# Patient Record
Sex: MALE | Race: WHITE | Employment: FULL TIME | URBAN - NONMETROPOLITAN AREA
[De-identification: names, ages, dates, MRNs, and addresses within clinical notes are randomized per-mention and may not be internally consistent; named-entity substitution may affect disease eponyms.]

---

## 2020-11-18 RX ORDER — SACUBITRIL AND VALSARTAN 24; 26 MG/1; MG/1
1 TABLET, FILM COATED ORAL 2 TIMES DAILY
COMMUNITY

## 2020-11-18 RX ORDER — DULOXETIN HYDROCHLORIDE 30 MG/1
30 CAPSULE, DELAYED RELEASE ORAL DAILY
COMMUNITY

## 2020-11-18 RX ORDER — MIRTAZAPINE 15 MG/1
45 TABLET, FILM COATED ORAL NIGHTLY
COMMUNITY

## 2020-11-18 RX ORDER — TIZANIDINE 4 MG/1
4 TABLET ORAL EVERY 8 HOURS PRN
COMMUNITY

## 2020-11-18 RX ORDER — CILOSTAZOL 100 MG/1
100 TABLET ORAL 2 TIMES DAILY
Status: ON HOLD | COMMUNITY
End: 2020-12-14 | Stop reason: HOSPADM

## 2020-11-18 RX ORDER — ALPRAZOLAM 1 MG/1
1 TABLET ORAL 2 TIMES DAILY
COMMUNITY

## 2020-11-18 RX ORDER — ROSUVASTATIN CALCIUM 10 MG/1
10 TABLET, COATED ORAL NIGHTLY
COMMUNITY

## 2020-11-18 RX ORDER — AMLODIPINE BESYLATE 5 MG/1
5 TABLET ORAL DAILY
Status: ON HOLD | COMMUNITY
End: 2020-12-14 | Stop reason: HOSPADM

## 2020-11-18 RX ORDER — CARVEDILOL 3.12 MG/1
3.12 TABLET ORAL 2 TIMES DAILY WITH MEALS
Status: ON HOLD | COMMUNITY
End: 2020-12-14 | Stop reason: HOSPADM

## 2020-11-24 ENCOUNTER — OFFICE VISIT (OUTPATIENT)
Dept: CARDIOTHORACIC SURGERY | Age: 60
End: 2020-11-24
Payer: COMMERCIAL

## 2020-11-24 VITALS
HEART RATE: 116 BPM | WEIGHT: 244 LBS | RESPIRATION RATE: 20 BRPM | BODY MASS INDEX: 34.16 KG/M2 | DIASTOLIC BLOOD PRESSURE: 80 MMHG | SYSTOLIC BLOOD PRESSURE: 110 MMHG | OXYGEN SATURATION: 97 % | HEIGHT: 71 IN

## 2020-11-24 PROCEDURE — 99205 OFFICE O/P NEW HI 60 MIN: CPT | Performed by: THORACIC SURGERY (CARDIOTHORACIC VASCULAR SURGERY)

## 2020-11-24 NOTE — LETTER
[address] [facility] [place], Cardiology  [address], [place] [address], [address] [address]  Phone: [phone]  Fax: [phone]            2020          Re:  Mr. Debra Lin   :  1960  [address] [place]  [facility] [address]    Mr. Katerina Camilo,    Please note that you will need to have pre-surgical Covid testing done on  between noon and 4pm at the [facility] , [address] . You will drive up to the front of the building and someone will come out to your car. Tell them you are there for pre-op covid testing. This test will need to be done that day for results to be available prior to your surgery. You will also need to come to [facility] registration on  at 8:45 am to have your other pre-op testing done. You will come to the hospital 12/10 at 630 am for surgery. Nothing to eat or drink after midnight. If you have any further questions, please do not hesitate to contact my office.     Sincerely,         Electronically signed: Britney Nunn MD, 2020 3:51 PM

## 2020-11-24 NOTE — PROGRESS NOTES
[address] [facility] Cardiothoracic Surgery  [address], [place] [address], [facility], [place]  Phone: [phone]  Fax: [phone]    Name: Alexis Miller  : 1960    DATE: 2020        The patient is a 26-year-old obese borderline diabetic male who presented recently to his nurse practitioner for routine follow-up. He was noted to be significantly tachycardic. Out of concern the patient suggested that he be seen by a cardiologist.  The patient was seen and evaluated by Dr. Karly Hamilton, cardiologist at [facility].  An echocardiogram was performed that demonstrated evidence of a dilated cardiomyopathy with a very low ejection fraction of 30%. For this reason the patient underwent cardiac catheterization on 2020. This reveals a left ventricle that shows a large area of anterior apical and inferior apical wall akinesis with a decreased ejection fraction of only 20%. The left main artery has some distal disease. The LAD is totally occluded proximally after a moderate size diagonal branch the diagonal branch itself appears to have a proximal 80% stenosis. The distal LAD is seen very poorly and appears to be inoperable. The left circumflex system appears to be a balanced system there is a proximal complex 90% stenosis of a large first obtuse marginal branch. The RCA is relatively small it is totally occluded near its origin there is a PDA that fills by right to right collaterals. The patient was placed on heart failure medication and the recommendation was to undergo coronary revascularization to preserve what remaining LV function that he has to prevent sudden cardiac death. The patient denies ever having chest pain or angina symptoms he does not have a history of neuropathy but he is a borderline diabetic he has smoked cigarettes since he was a teenager and smokes about a pack per day.   He denies any shortness activity     Days per week: Not on file     Minutes per session: Not on file    Stress: Not on file   Relationships    Social connections     Talks on phone: Not on file     Gets together: Not on file     Attends Sabianist service: Not on file     Active member of club or organization: Not on file     Attends meetings of clubs or organizations: Not on file     Relationship status: Not on file    Intimate partner violence     Fear of current or ex partner: Not on file     Emotionally abused: Not on file     Physically abused: Not on file     Forced sexual activity: Not on file   Other Topics Concern    Not on file   Social History Narrative    Not on file         ROS:   HEENT: denies neck pain, sore throat, blurred vision, diplopia. Respiratory: Denies shortness of breath. Denies any hemoptysis or wheezes. Cardiovascular: denies angina, palpitations, lower extremity edema. GI: denies abdominal pain, nausea, vomiting, constipation. : denies urinary frequency, or dysuria. Musculoskeletal: No joint pain or swelling. Neurologic: denies diplopia, headache, or ataxia. The other 14 systems have been reviewed and are negative    Physical Exam: /80 (Site: Left Upper Arm, Position: Sitting, Cuff Size: Medium Adult)   Pulse 116   Resp 20   Ht 5' 11\" (1.803 m)   Wt 244 lb (110.7 kg)   SpO2 97%   BMI 34.03 kg/m²   Pleasant middle-age male who is in no acute distress at this time  ENT: Throat is clear., Dentition is normal., Mucosa clear., Septum intact., No pyorrhea or abscess. Neck: Neck is soft., No cervical masses. , Throat is clear., Neck veins are not distended. , Thyroid is normal size. Respiratory: Clear lung sounds., Normal., No wheezes or rhonchi., No use of accessory muscles. Cardiovascular: Cardiac sounds are clear., Regular rate and rhythm., No murmur., No carotid bruits. , Peripheral pulses are intact., Extremities exhibit no edema or varicosities. ,  There appears to be a sinus tachycardia present  Abdomen: Abdomen is soft., No masses or tenderness. , Liver and spleen not palpable., Normal bowel sounds.,  Obese  Lymphatic: No lymphadenopathy in the cervical, axillary, or femoral areas. Musculoskeletal: No clubbing or cyanosis. , Normal muscle tone., Normal range of motion upper and lower extremities. , No joint inflammation or swelling. Skin: No rashes, lesions, or ulcers., No swelling or edema. Neurologic exam: No lateralizing neurologic findings. , Cranial nerves II-XII are normal., Examination of sensation is normal.  Psychiatric: Patient exhibits normal judgement and is oriented to name, time, and place., No anxiety or depression. This 69-year-old borderline diabetic has severe multivessel coronary disease with chronic occlusion of his RCA and LAD with severe ischemic dilated cardiomyopathy. The only large vessel remaining is the first obtuse marginal branch which has a high-grade proximal stenosis. This patient is at risk for sudden cardiac death. . On review of his films I feel that his LAD is an operable most of the RCA is an operable although I believe the PDA could be grafted. He has a moderate size diagonal branch that is graftable as well. I therefore discussed procedure of salvage multivessel coronary bypass grafting with the patient and his wife. We discussed the risks of the operation which includes bleeding stroke infection reoperation blood transfusion multisystem organ failure respiratory failure and sudden cardiac death. His STS risk score is 2% mortality with a 30% major morbidity mortality. The patient is wife agreed to proceed with bypass surgery I have scheduled his operation to be performed on 12/10/2020 he is to stop his Pletal at this time and stop his Glucophage 2 days before operation.      Electronically signed by Jesus Teixeira MD on 11/24/2020 at 3:40 PM

## 2020-11-30 ENCOUNTER — TELEPHONE (OUTPATIENT)
Dept: CARDIOTHORACIC SURGERY | Age: 60
End: 2020-11-30

## 2020-11-30 NOTE — TELEPHONE ENCOUNTER
Need authorization for surgery on 12/10/2020    Procedure/ CPT: CABG x3 (23282, 02010)    DX/ ICD 10: I 25.10  artherosclerosis of native caoronary artery of native heart without angina pectoris    I 25.5  Ischemic cardiomyopathy

## 2020-12-08 ENCOUNTER — PREP FOR PROCEDURE (OUTPATIENT)
Dept: CARDIOTHORACIC SURGERY | Age: 60
End: 2020-12-08

## 2020-12-08 RX ORDER — CHLORHEXIDINE GLUCONATE 4 G/100ML
SOLUTION TOPICAL ONCE
Status: CANCELLED | OUTPATIENT
Start: 2020-12-09

## 2020-12-08 RX ORDER — SODIUM CHLORIDE, SODIUM LACTATE, POTASSIUM CHLORIDE, CALCIUM CHLORIDE 600; 310; 30; 20 MG/100ML; MG/100ML; MG/100ML; MG/100ML
INJECTION, SOLUTION INTRAVENOUS CONTINUOUS
Status: CANCELLED | OUTPATIENT
Start: 2020-12-08

## 2020-12-08 NOTE — H&P
[address] [facility] Cardiothoracic Surgery  [address], [place]  Phone: [phone]  Fax: [phone]     Name: Florian De La O  : 1960     DATE: 2020           The patient is a 69-year-old obese borderline diabetic male who presented recently to his nurse practitioner for routine follow-up. He was noted to be significantly tachycardic. Out of concern the patient suggested that he be seen by a cardiologist.  The patient was seen and evaluated by Dr. Josie Perez, cardiologist at [facility].  An echocardiogram was performed that demonstrated evidence of a dilated cardiomyopathy with a very low ejection fraction of 30%. For this reason the patient underwent cardiac catheterization on 2020. This reveals a left ventricle that shows a large area of anterior apical and inferior apical wall akinesis with a decreased ejection fraction of only 20%. The left main artery has some distal disease. The LAD is totally occluded proximally after a moderate size diagonal branch the diagonal branch itself appears to have a proximal 80% stenosis. The distal LAD is seen very poorly and appears to be inoperable. The left circumflex system appears to be a balanced system there is a proximal complex 90% stenosis of a large first obtuse marginal branch. The RCA is relatively small it is totally occluded near its origin there is a PDA that fills by right to right collaterals. The patient was placed on heart failure medication and the recommendation was to undergo coronary revascularization to preserve what remaining LV function that he has to prevent sudden cardiac death. The patient denies ever having chest pain or angina symptoms he does not have a history of neuropathy but he is a borderline diabetic he has smoked cigarettes since he was a teenager and smokes about a pack per day.   He denies any shortness of breath or lower extremity edema     History reviewed. No pertinent past medical history. History reviewed. No pertinent surgical history. Current Outpatient Medications   Medication Sig Dispense Refill    ALPRAZolam (XANAX) 1 MG tablet Take 1 mg by mouth 2 times daily. For 30 days        amLODIPine (NORVASC) 5 MG tablet Take 5 mg by mouth daily For 30 days        aspirin EC 81 MG EC tablet Take 81 mg by mouth daily        carvedilol (COREG) 3.125 MG tablet Take 3.125 mg by mouth 2 times daily (with meals)        cilostazol (PLETAL) 100 MG tablet Take 100 mg by mouth 2 times daily For 30 days        DULoxetine (CYMBALTA) 30 MG extended release capsule Take 30 mg by mouth daily For 30 days        sacubitril-valsartan (ENTRESTO) 24-26 MG per tablet Take 1 tablet by mouth 2 times daily        mirtazapine (REMERON) 15 MG tablet Take 45 mg by mouth daily        rosuvastatin (CRESTOR) 10 MG tablet Take 10 mg by mouth daily For 30 days        tiZANidine (ZANAFLEX) 4 MG tablet Take 4 mg by mouth every 8 hours as needed For 20 days          No current facility-administered medications for this visit.       No Known Allergies  History reviewed. No pertinent family history.   Social History            Socioeconomic History    Marital status: Single       Spouse name: Not on file    Number of children: Not on file    Years of education: Not on file    Highest education level: Not on file   Occupational History    Not on file   Social Needs    Financial resource strain: Not on file    Food insecurity       Worry: Not on file       Inability: Not on file    Transportation needs       Medical: Not on file       Non-medical: Not on file   Tobacco Use    Smoking status: Current Every Day Smoker       Packs/day: 0.50       Years: 30.00       Pack years: 15.00       Types: Cigarettes    Smokeless tobacco: Never Used   Substance and Sexual Activity    Alcohol use: Not Currently    Drug use: Never    Sexual activity: Not on file   Lifestyle    Physical activity       Days per week: Not on file       Minutes per session: Not on file    Stress: Not on file   Relationships    Social connections       Talks on phone: Not on file       Gets together: Not on file       Attends Mormonism service: Not on file       Active member of club or organization: Not on file       Attends meetings of clubs or organizations: Not on file       Relationship status: Not on file    Intimate partner violence       Fear of current or ex partner: Not on file       Emotionally abused: Not on file       Physically abused: Not on file       Forced sexual activity: Not on file   Other Topics Concern    Not on file   Social History Narrative    Not on file            ROS:   HEENT: denies neck pain, sore throat, blurred vision, diplopia.    Respiratory: Denies shortness of breath.  Denies any hemoptysis or wheezes.    Cardiovascular: denies angina, palpitations, lower extremity edema.    GI: denies abdominal pain, nausea, vomiting, constipation. : denies urinary frequency, or dysuria.    Musculoskeletal: No joint pain or swelling. Neurologic: denies diplopia, headache, or ataxia.    The other 14 systems have been reviewed and are negative     Physical Exam: /80 (Site: Left Upper Arm, Position: Sitting, Cuff Size: Medium Adult)   Pulse 116   Resp 20   Ht 5' 11\" (1.803 m)   Wt 244 lb (110.7 kg)   SpO2 97%   BMI 34.03 kg/m²   Pleasant middle-age male who is in no acute distress at this time  ENT: Throat is clear., Dentition is normal., Mucosa clear., Septum intact., No pyorrhea or abscess. Neck: Neck is soft., No cervical masses. , Throat is clear., Neck veins are not distended. , Thyroid is normal size. Respiratory: Clear lung sounds., Normal., No wheezes or rhonchi., No use of accessory muscles. Cardiovascular: Cardiac sounds are clear., Regular rate and rhythm., No murmur., No carotid bruits. , Peripheral pulses are intact., Extremities exhibit no edema or varicosities. ,  There appears to be a sinus tachycardia present  Abdomen: Abdomen is soft., No masses or tenderness. , Liver and spleen not palpable., Normal bowel sounds.,  Obese  Lymphatic: No lymphadenopathy in the cervical, axillary, or femoral areas. Musculoskeletal: No clubbing or cyanosis. , Normal muscle tone., Normal range of motion upper and lower extremities. , No joint inflammation or swelling. Skin: No rashes, lesions, or ulcers., No swelling or edema. Neurologic exam: No lateralizing neurologic findings. , Cranial nerves II-XII are normal., Examination of sensation is normal.  Psychiatric: Patient exhibits normal judgement and is oriented to name, time, and place., No anxiety or depression.     This 60year-old borderline diabetic has severe multivessel coronary disease with chronic occlusion of his RCA and LAD with severe ischemic dilated cardiomyopathy. The only large vessel remaining is the first obtuse marginal branch which has a high-grade proximal stenosis. This patient is at risk for sudden cardiac death. . On review of his films I feel that his LAD is an operable most of the RCA is an operable although I believe the PDA could be grafted. He has a moderate size diagonal branch that is graftable as well. I therefore discussed procedure of salvage multivessel coronary bypass grafting with the patient and his wife. We discussed the risks of the operation which includes bleeding stroke infection reoperation blood transfusion multisystem organ failure respiratory failure and sudden cardiac death. His STS risk score is 2% mortality with a 30% major morbidity mortality.   The patient is wife agreed to proceed with bypass surgery I have scheduled his operation to be performed on 12/10/2020 he is to stop his Pletal at this time and stop his Glucophage 2 days before operation.      Electronically signed by Pasquale Knight MD on 11/24/2020 at 3:40 PM          Since Your Last Visit (2wk Ago)    Today  This Visit: Prep for Procedure with Me  Nov 30  Telephone with LUI Longo  Surgery Scheduling  Nov 24  Office Visit with Me  Atherosclerosis of native coronary artery of native heart without angina pectoris (Primary Dx); Ischemic cardiomyopathy     Significant History/Details     needed? No   Smoking: Current Every Day Smoker, 0.5 ppd, 15 pack-years   Smokeless Tobacco: Never Used   Alcohol: Not Currently     Specialty Comments EditShow All   No comments regarding your specialty     Family Comments Edit   None     Care Team and Communications    No referring provider set      No PCP set      Other Patient Care Team Members Specialty   Antonio MD [facility]   My Last Care Coordination            There are no notes for this patient that meet the current filters. Ciro as Reviewed Allergies (Review Complete by You on 11/24/2020)    Medications, History (Surgical) (Reviewed by You on 11/24/2020)    Problem List Never Reviewed         Allergies    New allergies from outside sources are available for reconciliation    No Known Allergies       Ciro as: Review Complete Review Complete by You on 11/24/2020     Problem List Collapse by Default   Collapse by Default        New problems from outside sources are available for reconciliation   None    Selena Belller as Reviewed Never Reviewed   Expand to view 0 more items     None     Surgical History Collapse by Default   Collapse by Default        None   Ciro as Reviewed Reviewed by You on 11/24/2020   Expand to view 0 items    Implants    No implants to display     Lifetime Radiation    None     Medications    New medications from outside sources are available for reconciliation    ALPRAZolam (XANAX) 1 MG tablet Take 1 mg by mouth 2 times daily.  For 30 days    amLODIPine (NORVASC) 5 MG tablet Take 5 mg by mouth daily For 30 days    aspirin EC 81 MG EC tablet Take 81 mg by mouth daily    carvedilol (COREG) 3.125 MG tablet Take 3.125 mg by mouth 2 times daily (with meals)    cilostazol (PLETAL) 100 MG tablet Take 100 mg by mouth 2 times daily For 30 days    DULoxetine (CYMBALTA) 30 MG extended release capsule Take 30 mg by mouth daily For 30 days    sacubitril-valsartan (ENTRESTO) 24-26 MG per tablet Take 1 tablet by mouth 2 times daily    mirtazapine (REMERON) 15 MG tablet Take 45 mg by mouth daily    rosuvastatin (CRESTOR) 10 MG tablet Take 10 mg by mouth daily For 30 days    tiZANidine (ZANAFLEX) 4 MG tablet Take 4 mg by mouth every 8 hours as needed For 20 days   Ciro as Reviewed Reviewed by Alla Burgess on 11/24/2020     Immunizations/Injections    None      Reminders and Results    None

## 2020-12-08 NOTE — H&P (VIEW-ONLY)
shortness of breath or lower extremity edema     History reviewed. No pertinent past medical history. History reviewed. No pertinent surgical history. Current Outpatient Medications   Medication Sig Dispense Refill    ALPRAZolam (XANAX) 1 MG tablet Take 1 mg by mouth 2 times daily. For 30 days        amLODIPine (NORVASC) 5 MG tablet Take 5 mg by mouth daily For 30 days        aspirin EC 81 MG EC tablet Take 81 mg by mouth daily        carvedilol (COREG) 3.125 MG tablet Take 3.125 mg by mouth 2 times daily (with meals)        cilostazol (PLETAL) 100 MG tablet Take 100 mg by mouth 2 times daily For 30 days        DULoxetine (CYMBALTA) 30 MG extended release capsule Take 30 mg by mouth daily For 30 days        sacubitril-valsartan (ENTRESTO) 24-26 MG per tablet Take 1 tablet by mouth 2 times daily        mirtazapine (REMERON) 15 MG tablet Take 45 mg by mouth daily        rosuvastatin (CRESTOR) 10 MG tablet Take 10 mg by mouth daily For 30 days        tiZANidine (ZANAFLEX) 4 MG tablet Take 4 mg by mouth every 8 hours as needed For 20 days          No current facility-administered medications for this visit.       No Known Allergies  History reviewed. No pertinent family history.   Social History            Socioeconomic History    Marital status: Single       Spouse name: Not on file    Number of children: Not on file    Years of education: Not on file    Highest education level: Not on file   Occupational History    Not on file   Social Needs    Financial resource strain: Not on file    Food insecurity       Worry: Not on file       Inability: Not on file    Transportation needs       Medical: Not on file       Non-medical: Not on file   Tobacco Use    Smoking status: Current Every Day Smoker       Packs/day: 0.50       Years: 30.00       Pack years: 15.00       Types: Cigarettes    Smokeless tobacco: Never Used   Substance and Sexual Activity    Alcohol use: Not Currently    Drug use: Never    Sexual activity: Not on file   Lifestyle    Physical activity       Days per week: Not on file       Minutes per session: Not on file    Stress: Not on file   Relationships    Social connections       Talks on phone: Not on file       Gets together: Not on file       Attends [employer] service: Not on file       Active member of club or organization: Not on file       Attends meetings of clubs or organizations: Not on file       Relationship status: Not on file    Intimate partner violence       Fear of current or ex partner: Not on file       Emotionally abused: Not on file       Physically abused: Not on file       Forced sexual activity: Not on file   Other Topics Concern    Not on file   Social History Narrative    Not on file            ROS:   HEENT: denies neck pain, sore throat, blurred vision, diplopia.    Respiratory: Denies shortness of breath.  Denies any hemoptysis or wheezes.    Cardiovascular: denies angina, palpitations, lower extremity edema.    GI: denies abdominal pain, nausea, vomiting, constipation. : denies urinary frequency, or dysuria.    Musculoskeletal: No joint pain or swelling. Neurologic: denies diplopia, headache, or ataxia.    The other 14 systems have been reviewed and are negative     Physical Exam: /80 (Site: Left Upper Arm, Position: Sitting, Cuff Size: Medium Adult)   Pulse 116   Resp 20   Ht 5' 11\" (1.803 m)   Wt 244 lb (110.7 kg)   SpO2 97%   BMI 34.03 kg/m²   Pleasant middle-age male who is in no acute distress at this time  ENT: Throat is clear., Dentition is normal., Mucosa clear., Septum intact., No pyorrhea or abscess. Neck: Neck is soft., No cervical masses. , Throat is clear., Neck veins are not distended. , Thyroid is normal size. Respiratory: Clear lung sounds., Normal., No wheezes or rhonchi., No use of accessory muscles. Cardiovascular: Cardiac sounds are clear., Regular rate and rhythm., No murmur., No carotid bruits. , Peripheral pulses are intact., Extremities exhibit no edema or varicosities. ,  There appears to be a sinus tachycardia present  Abdomen: Abdomen is soft., No masses or tenderness. , Liver and spleen not palpable., Normal bowel sounds.,  Obese  Lymphatic: No lymphadenopathy in the cervical, axillary, or femoral areas. Musculoskeletal: No clubbing or cyanosis. , Normal muscle tone., Normal range of motion upper and lower extremities. , No joint inflammation or swelling. Skin: No rashes, lesions, or ulcers., No swelling or edema. Neurologic exam: No lateralizing neurologic findings. , Cranial nerves II-XII are normal., Examination of sensation is normal.  Psychiatric: Patient exhibits normal judgement and is oriented to name, time, and place., No anxiety or depression.     This 60year-old borderline diabetic has severe multivessel coronary disease with chronic occlusion of his RCA and LAD with severe ischemic dilated cardiomyopathy. The only large vessel remaining is the first obtuse marginal branch which has a high-grade proximal stenosis. This patient is at risk for sudden cardiac death. . On review of his films I feel that his LAD is an operable most of the RCA is an operable although I believe the PDA could be grafted. He has a moderate size diagonal branch that is graftable as well. I therefore discussed procedure of salvage multivessel coronary bypass grafting with the patient and his wife. We discussed the risks of the operation which includes bleeding stroke infection reoperation blood transfusion multisystem organ failure respiratory failure and sudden cardiac death. His STS risk score is 2% mortality with a 30% major morbidity mortality.   The patient is wife agreed to proceed with bypass surgery I have scheduled his operation to be performed on 12/10/2020 he is to stop his Pletal at this time and stop his Glucophage 2 days before operation.      Electronically signed by Hailee Martinez MD on 11/24/2020 at 3:40 PM          Since Your Last Visit (2wk Ago)    Today  This Visit: Prep for Procedure with Me  Nov 30  Telephone with LUI Recinos  Surgery Scheduling  Nov 24  Office Visit with Me  Atherosclerosis of native coronary artery of native heart without angina pectoris (Primary Dx); Ischemic cardiomyopathy     Significant History/Details     needed? No   Smoking: Current Every Day Smoker, 0.5 ppd, 15 pack-years   Smokeless Tobacco: Never Used   Alcohol: Not Currently     Specialty Comments EditShow All   No comments regarding your specialty     Family Comments Edit   None     Care Team and Communications    No referring provider set      No PCP set      Other Patient Care Team Members Specialty   Elliot Gleason MD Cardiothoracic Surgery   My Last Care Coordination            There are no notes for this patient that meet the current filters. Ciro as Reviewed Allergies (Review Complete by You on 11/24/2020)    Medications, History (Surgical) (Reviewed by You on 11/24/2020)    Problem List Never Reviewed         Allergies    New allergies from outside sources are available for reconciliation    No Known Allergies       Ciro as: Review Complete Review Complete by You on 11/24/2020     Problem List Collapse by Default   Collapse by Default        New problems from outside sources are available for reconciliation   None    Fernando Chiu as Reviewed Never Reviewed   Expand to view 0 more items     None     Surgical History Collapse by Default   Collapse by Default        None   Ciro as Reviewed Reviewed by You on 11/24/2020   Expand to view 0 items    Implants    No implants to display     Lifetime Radiation    None     Medications    New medications from outside sources are available for reconciliation    ALPRAZolam (XANAX) 1 MG tablet Take 1 mg by mouth 2 times daily.  For 30 days    amLODIPine (NORVASC) 5 MG tablet Take 5 mg by mouth daily For 30 days    aspirin EC 81 MG EC tablet Take 81 mg by mouth daily    carvedilol (COREG) 3.125 MG tablet Take 3.125 mg by mouth 2 times daily (with meals)    cilostazol (PLETAL) 100 MG tablet Take 100 mg by mouth 2 times daily For 30 days    DULoxetine (CYMBALTA) 30 MG extended release capsule Take 30 mg by mouth daily For 30 days    sacubitril-valsartan (ENTRESTO) 24-26 MG per tablet Take 1 tablet by mouth 2 times daily    mirtazapine (REMERON) 15 MG tablet Take 45 mg by mouth daily    rosuvastatin (CRESTOR) 10 MG tablet Take 10 mg by mouth daily For 30 days    tiZANidine (ZANAFLEX) 4 MG tablet Take 4 mg by mouth every 8 hours as needed For 20 days   Ciro as Reviewed Reviewed by Patsy Mendoza on 11/24/2020     Immunizations/Injections    None      Reminders and Results    None

## 2020-12-09 ENCOUNTER — HOSPITAL ENCOUNTER (OUTPATIENT)
Dept: PREADMISSION TESTING | Age: 60
Discharge: HOME | DRG: 236 | End: 2020-12-13
Payer: COMMERCIAL

## 2020-12-09 ENCOUNTER — HOSPITAL ENCOUNTER (OUTPATIENT)
Dept: GENERAL RADIOLOGY | Age: 60
Discharge: HOME | DRG: 236 | End: 2020-12-09
Attending: THORACIC SURGERY (CARDIOTHORACIC VASCULAR SURGERY)
Payer: COMMERCIAL

## 2020-12-09 ENCOUNTER — ANESTHESIA EVENT (OUTPATIENT)
Dept: OPERATING ROOM | Age: 60
DRG: 236 | End: 2020-12-09
Payer: COMMERCIAL

## 2020-12-09 VITALS — BODY MASS INDEX: 33.32 KG/M2 | WEIGHT: 238 LBS | HEIGHT: 71 IN

## 2020-12-09 LAB
ABO/RH: NORMAL
ALBUMIN SERPL-MCNC: 4.1 G/DL (ref 3.5–5.2)
ALP BLD-CCNC: 107 U/L (ref 40–130)
ALT SERPL-CCNC: 23 U/L (ref 5–41)
ANION GAP SERPL CALCULATED.3IONS-SCNC: 12 MMOL/L (ref 7–19)
ANTIBODY SCREEN: NORMAL
AST SERPL-CCNC: 24 U/L (ref 5–40)
BASOPHILS ABSOLUTE: 0.1 K/UL (ref 0–0.2)
BASOPHILS RELATIVE PERCENT: 0.8 % (ref 0–1)
BILIRUB SERPL-MCNC: 0.3 MG/DL (ref 0.2–1.2)
BILIRUBIN URINE: ABNORMAL
BLOOD, URINE: NEGATIVE
BUN BLDV-MCNC: 7 MG/DL (ref 8–23)
CALCIUM SERPL-MCNC: 9.2 MG/DL (ref 8.8–10.2)
CHLORIDE BLD-SCNC: 105 MMOL/L (ref 98–111)
CLARITY: CLEAR
CO2: 23 MMOL/L (ref 22–29)
COLOR: ABNORMAL
CREAT SERPL-MCNC: 0.7 MG/DL (ref 0.5–1.2)
EKG P AXIS: 41 DEGREES
EKG P-R INTERVAL: 184 MS
EKG Q-T INTERVAL: 422 MS
EKG QRS DURATION: 102 MS
EKG QTC CALCULATION (BAZETT): 446 MS
EKG T AXIS: 88 DEGREES
EOSINOPHILS ABSOLUTE: 0.3 K/UL (ref 0–0.6)
EOSINOPHILS RELATIVE PERCENT: 4.6 % (ref 0–5)
FIBRINOGEN: 472 MG/DL (ref 238–505)
GFR AFRICAN AMERICAN: >59
GFR NON-AFRICAN AMERICAN: >60
GLUCOSE BLD-MCNC: 177 MG/DL (ref 74–109)
GLUCOSE URINE: =>1000 MG/DL
HBA1C MFR BLD: 8.7 % (ref 4–6)
HCT VFR BLD CALC: 51.3 % (ref 42–52)
HEMOGLOBIN: 16.5 G/DL (ref 14–18)
IMMATURE GRANULOCYTES #: 0 K/UL
INR BLD: 1.02 (ref 0.88–1.18)
KETONES, URINE: ABNORMAL MG/DL
LEUKOCYTE ESTERASE, URINE: NEGATIVE
LYMPHOCYTES ABSOLUTE: 3.9 K/UL (ref 1.1–4.5)
LYMPHOCYTES RELATIVE PERCENT: 55.2 % (ref 20–40)
MAGNESIUM: 2.3 MG/DL (ref 1.6–2.4)
MCH RBC QN AUTO: 29.6 PG (ref 27–31)
MCHC RBC AUTO-ENTMCNC: 32.2 G/DL (ref 33–37)
MCV RBC AUTO: 92.1 FL (ref 80–94)
MONOCYTES ABSOLUTE: 0.6 K/UL (ref 0–0.9)
MONOCYTES RELATIVE PERCENT: 7.9 % (ref 0–10)
NEUTROPHILS ABSOLUTE: 2.2 K/UL (ref 1.5–7.5)
NEUTROPHILS RELATIVE PERCENT: 31.4 % (ref 50–65)
NITRITE, URINE: NEGATIVE
PDW BLD-RTO: 12.9 % (ref 11.5–14.5)
PH UA: 5.5 (ref 5–8)
PLATELET # BLD: 155 K/UL (ref 130–400)
PMV BLD AUTO: 12.1 FL (ref 9.4–12.4)
POTASSIUM SERPL-SCNC: 3.9 MMOL/L (ref 3.5–5)
PROTEIN UA: ABNORMAL MG/DL
PROTHROMBIN TIME: 13.3 SEC (ref 12–14.6)
RBC # BLD: 5.57 M/UL (ref 4.7–6.1)
SARS-COV-2, NAAT: NOT DETECTED
SODIUM BLD-SCNC: 140 MMOL/L (ref 136–145)
SPECIFIC GRAVITY UA: 1.03 (ref 1–1.03)
TOTAL PROTEIN: 7.5 G/DL (ref 6.6–8.7)
UROBILINOGEN, URINE: 1 E.U./DL
WBC # BLD: 7.1 K/UL (ref 4.8–10.8)

## 2020-12-09 PROCEDURE — 6370000000 HC RX 637 (ALT 250 FOR IP): Performed by: THORACIC SURGERY (CARDIOTHORACIC VASCULAR SURGERY)

## 2020-12-09 PROCEDURE — 85384 FIBRINOGEN ACTIVITY: CPT

## 2020-12-09 PROCEDURE — 86901 BLOOD TYPING SEROLOGIC RH(D): CPT

## 2020-12-09 PROCEDURE — 71046 X-RAY EXAM CHEST 2 VIEWS: CPT

## 2020-12-09 PROCEDURE — 80053 COMPREHEN METABOLIC PANEL: CPT

## 2020-12-09 PROCEDURE — 83036 HEMOGLOBIN GLYCOSYLATED A1C: CPT

## 2020-12-09 PROCEDURE — 87081 CULTURE SCREEN ONLY: CPT

## 2020-12-09 PROCEDURE — 93005 ELECTROCARDIOGRAM TRACING: CPT

## 2020-12-09 PROCEDURE — 85025 COMPLETE CBC W/AUTO DIFF WBC: CPT

## 2020-12-09 PROCEDURE — 83735 ASSAY OF MAGNESIUM: CPT

## 2020-12-09 PROCEDURE — U0002 COVID-19 LAB TEST NON-CDC: HCPCS

## 2020-12-09 PROCEDURE — 85610 PROTHROMBIN TIME: CPT

## 2020-12-09 PROCEDURE — 86900 BLOOD TYPING SEROLOGIC ABO: CPT

## 2020-12-09 PROCEDURE — 81003 URINALYSIS AUTO W/O SCOPE: CPT

## 2020-12-09 PROCEDURE — 86850 RBC ANTIBODY SCREEN: CPT

## 2020-12-09 RX ORDER — CHLORHEXIDINE GLUCONATE 4 G/100ML
SOLUTION TOPICAL ONCE
Status: COMPLETED | OUTPATIENT
Start: 2020-12-09 | End: 2020-12-09

## 2020-12-09 RX ADMIN — MUPIROCIN: 20 OINTMENT TOPICAL at 12:45

## 2020-12-09 RX ADMIN — CHLORHEXIDINE GLUCONATE: 213 SOLUTION TOPICAL at 09:12

## 2020-12-10 ENCOUNTER — ANESTHESIA (OUTPATIENT)
Dept: OPERATING ROOM | Age: 60
DRG: 236 | End: 2020-12-10
Payer: COMMERCIAL

## 2020-12-10 ENCOUNTER — HOSPITAL ENCOUNTER (INPATIENT)
Age: 60
LOS: 4 days | Discharge: HOME | DRG: 236 | End: 2020-12-14
Attending: THORACIC SURGERY (CARDIOTHORACIC VASCULAR SURGERY) | Admitting: THORACIC SURGERY (CARDIOTHORACIC VASCULAR SURGERY)
Payer: COMMERCIAL

## 2020-12-10 ENCOUNTER — APPOINTMENT (OUTPATIENT)
Dept: GENERAL RADIOLOGY | Age: 60
DRG: 236 | End: 2020-12-10
Attending: THORACIC SURGERY (CARDIOTHORACIC VASCULAR SURGERY)
Payer: COMMERCIAL

## 2020-12-10 VITALS
TEMPERATURE: 98.1 F | OXYGEN SATURATION: 98 % | SYSTOLIC BLOOD PRESSURE: 128 MMHG | RESPIRATION RATE: 2 BRPM | DIASTOLIC BLOOD PRESSURE: 78 MMHG

## 2020-12-10 PROBLEM — I25.10 CAD IN NATIVE ARTERY: Status: ACTIVE | Noted: 2020-12-10

## 2020-12-10 LAB
ABO/RH: NORMAL
ANION GAP SERPL CALCULATED.3IONS-SCNC: 9 MMOL/L (ref 7–19)
ANTIBODY SCREEN: NORMAL
APTT: 36 SEC (ref 26–36.2)
BASE EXCESS ARTERIAL: -1 (ref -3–3)
BASE EXCESS ARTERIAL: -1 (ref -3–3)
BASE EXCESS ARTERIAL: -1 MMOL/L (ref -2–2)
BASE EXCESS ARTERIAL: -2 (ref -3–3)
BASE EXCESS ARTERIAL: 1 (ref -3–3)
BASE EXCESS ARTERIAL: 1 (ref -3–3)
BUN BLDV-MCNC: 10 MG/DL (ref 8–23)
CALCIUM IONIZED: 1.04 MMOL/L (ref 1.1–1.3)
CALCIUM IONIZED: 1.05 MMOL/L (ref 1.1–1.3)
CALCIUM IONIZED: 1.12 MMOL/L (ref 1.1–1.3)
CALCIUM IONIZED: 1.13 MMOL/L (ref 1.1–1.3)
CALCIUM IONIZED: 1.14 MMOL/L (ref 1.1–1.3)
CALCIUM SERPL-MCNC: 7.5 MG/DL (ref 8.8–10.2)
CARBOXYHEMOGLOBIN ARTERIAL: 2.2 % (ref 0–5)
CHLORIDE BLD-SCNC: 110 MMOL/L (ref 98–111)
CO2: 23 MMOL/L (ref 22–29)
CO2: 25 MEQ/L (ref 21–32)
CO2: 26 MEQ/L (ref 21–32)
CO2: 26 MEQ/L (ref 21–32)
CREAT SERPL-MCNC: 0.9 MG/DL (ref 0.5–1.2)
GFR AFRICAN AMERICAN: >59
GFR AFRICAN AMERICAN: >60
GFR NON-AFRICAN AMERICAN: >60
GLUCOSE BLD-MCNC: 103 MG/DL (ref 70–99)
GLUCOSE BLD-MCNC: 112 MG/DL (ref 70–99)
GLUCOSE BLD-MCNC: 114 MG/DL (ref 70–99)
GLUCOSE BLD-MCNC: 120 MG/DL (ref 70–99)
GLUCOSE BLD-MCNC: 127 MG/DL (ref 70–99)
GLUCOSE BLD-MCNC: 132 MG/DL (ref 70–99)
GLUCOSE BLD-MCNC: 136 MG/DL (ref 70–99)
GLUCOSE BLD-MCNC: 136 MG/DL (ref 70–99)
GLUCOSE BLD-MCNC: 139 MG/DL (ref 70–99)
GLUCOSE BLD-MCNC: 139 MG/DL (ref 70–99)
GLUCOSE BLD-MCNC: 157 MG/DL (ref 74–109)
GLUCOSE BLD-MCNC: 167 MG/DL (ref 70–99)
HCO3 ARTERIAL: 25.7 MMOL/L (ref 22–26)
HCT VFR BLD CALC: 33.2 % (ref 42–52)
HCT VFR BLD CALC: 39.1 % (ref 42–52)
HEMOGLOBIN, ART, EXTENDED: 11.1 G/DL (ref 14–18)
HEMOGLOBIN: 10.7 G/DL (ref 14–18)
HEMOGLOBIN: 12.3 GM/DL (ref 12–18)
HEMOGLOBIN: 12.4 GM/DL (ref 12–18)
HEMOGLOBIN: 12.7 G/DL (ref 14–18)
HEMOGLOBIN: 14.3 GM/DL (ref 12–18)
HEMOGLOBIN: 14.4 GM/DL (ref 12–18)
HEMOGLOBIN: 9.9 GM/DL (ref 12–18)
INR BLD: 1.43 (ref 0.88–1.18)
MAGNESIUM: 1.7 MG/DL (ref 1.6–2.4)
MCH RBC QN AUTO: 30 PG (ref 27–31)
MCH RBC QN AUTO: 30.2 PG (ref 27–31)
MCHC RBC AUTO-ENTMCNC: 32.2 G/DL (ref 33–37)
MCHC RBC AUTO-ENTMCNC: 32.5 G/DL (ref 33–37)
MCV RBC AUTO: 92.4 FL (ref 80–94)
MCV RBC AUTO: 93.8 FL (ref 80–94)
METHEMOGLOBIN ARTERIAL: 1.3 %
O2 CONTENT ARTERIAL: 14.5 ML/DL
O2 SAT, ARTERIAL: 100 % (ref 93–100)
O2 SAT, ARTERIAL: 92.9 %
O2 SAT, ARTERIAL: 97 % (ref 93–100)
O2 THERAPY: ABNORMAL
PCO2 ARTERIAL: 41 MM HG (ref 35–48)
PCO2 ARTERIAL: 42 MM HG (ref 35–48)
PCO2 ARTERIAL: 43 MM HG (ref 35–48)
PCO2 ARTERIAL: 45 MM HG (ref 35–48)
PCO2 ARTERIAL: 50 MM HG (ref 35–48)
PCO2 ARTERIAL: 51 MMHG (ref 35–45)
PDW BLD-RTO: 13.1 % (ref 11.5–14.5)
PDW BLD-RTO: 13.2 % (ref 11.5–14.5)
PERFORMED ON: ABNORMAL
PH ARTERIAL: 7.3 (ref 7.3–7.5)
PH ARTERIAL: 7.31 (ref 7.35–7.45)
PH ARTERIAL: 7.35 (ref 7.3–7.5)
PH ARTERIAL: 7.37 (ref 7.3–7.5)
PH ARTERIAL: 7.41 (ref 7.3–7.5)
PH ARTERIAL: 7.41 (ref 7.3–7.5)
PLATELET # BLD: 83 K/UL (ref 130–400)
PLATELET # BLD: 89 K/UL (ref 130–400)
PMV BLD AUTO: 11.6 FL (ref 9.4–12.4)
PMV BLD AUTO: 11.6 FL (ref 9.4–12.4)
PO2 ARTERIAL: 184 MM HG (ref 83–108)
PO2 ARTERIAL: 305 MM HG (ref 83–108)
PO2 ARTERIAL: 625 MM HG (ref 83–108)
PO2 ARTERIAL: 628 MM HG (ref 83–108)
PO2 ARTERIAL: 66 MMHG (ref 80–100)
PO2 ARTERIAL: 97 MM HG (ref 83–108)
POC ANION GAP: 10
POC ANION GAP: 11
POC ANION GAP: 12
POC ANION GAP: 12
POC ANION GAP: 9
POC CHLORIDE: 106 MEQ/L (ref 99–110)
POC CHLORIDE: 108 MEQ/L (ref 99–110)
POC CREATININE: 0.5 MG/DL (ref 0.3–1.3)
POC CREATININE: 0.5 MG/DL (ref 0.3–1.3)
POC CREATININE: 0.7 MG/DL (ref 0.3–1.3)
POC CREATININE: 0.8 MG/DL (ref 0.3–1.3)
POC CREATININE: 1 MG/DL (ref 0.3–1.3)
POC HEMATOCRIT: 29 % (ref 37–52)
POC HEMATOCRIT: 36 % (ref 37–52)
POC HEMATOCRIT: 37 % (ref 37–52)
POC HEMATOCRIT: 42 % (ref 37–52)
POC HEMATOCRIT: 42 % (ref 37–52)
POC POTASSIUM: 3.7 MEQ/L (ref 3.5–5.1)
POC POTASSIUM: 3.7 MEQ/L (ref 3.5–5.1)
POC POTASSIUM: 3.8 MEQ/L (ref 3.5–5.1)
POC POTASSIUM: 3.9 MEQ/L (ref 3.5–5.1)
POC POTASSIUM: 3.9 MEQ/L (ref 3.5–5.1)
POC SAMPLE TYPE: ABNORMAL
POC SODIUM: 141 MEQ/L (ref 136–145)
POC SODIUM: 142 MEQ/L (ref 136–145)
POC SODIUM: 143 MEQ/L (ref 136–145)
POC SODIUM: 143 MEQ/L (ref 136–145)
POC SODIUM: 144 MEQ/L (ref 136–145)
POTASSIUM SERPL-SCNC: 4.2 MMOL/L (ref 3.5–5)
POTASSIUM SERPL-SCNC: 4.5 MMOL/L (ref 3.5–5)
POTASSIUM, WHOLE BLOOD: 4.2
PROTHROMBIN TIME: 17.5 SEC (ref 12–14.6)
RBC # BLD: 3.54 M/UL (ref 4.7–6.1)
RBC # BLD: 4.23 M/UL (ref 4.7–6.1)
SODIUM BLD-SCNC: 142 MMOL/L (ref 136–145)
TCO2 ARTERIAL: 26 MMOL/L
TCO2 ARTERIAL: 26 MMOL/L
TCO2 ARTERIAL: 27 MMOL/L
TCO2 ARTERIAL: 28 MMOL/L
TCO2 ARTERIAL: 28 MMOL/L
WBC # BLD: 11.7 K/UL (ref 4.8–10.8)
WBC # BLD: 17.6 K/UL (ref 4.8–10.8)

## 2020-12-10 PROCEDURE — 37799 UNLISTED PX VASCULAR SURGERY: CPT

## 2020-12-10 PROCEDURE — C1729 CATH, DRAINAGE: HCPCS | Performed by: THORACIC SURGERY (CARDIOTHORACIC VASCULAR SURGERY)

## 2020-12-10 PROCEDURE — 3600000090 HC PERFUSION PUMP ON: Performed by: THORACIC SURGERY (CARDIOTHORACIC VASCULAR SURGERY)

## 2020-12-10 PROCEDURE — 33533 CABG ARTERIAL SINGLE: CPT | Performed by: THORACIC SURGERY (CARDIOTHORACIC VASCULAR SURGERY)

## 2020-12-10 PROCEDURE — 71045 X-RAY EXAM CHEST 1 VIEW: CPT

## 2020-12-10 PROCEDURE — 86923 COMPATIBILITY TEST ELECTRIC: CPT

## 2020-12-10 PROCEDURE — 82803 BLOOD GASES ANY COMBINATION: CPT

## 2020-12-10 PROCEDURE — 2700000000 HC OXYGEN THERAPY PER DAY

## 2020-12-10 PROCEDURE — B24BZZ4 ULTRASONOGRAPHY OF HEART WITH AORTA, TRANSESOPHAGEAL: ICD-10-PCS | Performed by: THORACIC SURGERY (CARDIOTHORACIC VASCULAR SURGERY)

## 2020-12-10 PROCEDURE — P9045 ALBUMIN (HUMAN), 5%, 250 ML: HCPCS | Performed by: THORACIC SURGERY (CARDIOTHORACIC VASCULAR SURGERY)

## 2020-12-10 PROCEDURE — 82330 ASSAY OF CALCIUM: CPT

## 2020-12-10 PROCEDURE — 5A1935Z RESPIRATORY VENTILATION, LESS THAN 24 CONSECUTIVE HOURS: ICD-10-PCS | Performed by: THORACIC SURGERY (CARDIOTHORACIC VASCULAR SURGERY)

## 2020-12-10 PROCEDURE — 02110Z3 BYPASS CORONARY ARTERY, TWO ARTERIES FROM CORONARY ARTERY, OPEN APPROACH: ICD-10-PCS | Performed by: THORACIC SURGERY (CARDIOTHORACIC VASCULAR SURGERY)

## 2020-12-10 PROCEDURE — 3600000018 HC SURGERY OHS ADDTL 15MIN: Performed by: THORACIC SURGERY (CARDIOTHORACIC VASCULAR SURGERY)

## 2020-12-10 PROCEDURE — 86901 BLOOD TYPING SEROLOGIC RH(D): CPT

## 2020-12-10 PROCEDURE — 82435 ASSAY OF BLOOD CHLORIDE: CPT

## 2020-12-10 PROCEDURE — 84132 ASSAY OF SERUM POTASSIUM: CPT

## 2020-12-10 PROCEDURE — 82947 ASSAY GLUCOSE BLOOD QUANT: CPT

## 2020-12-10 PROCEDURE — 3700000001 HC ADD 15 MINUTES (ANESTHESIA): Performed by: THORACIC SURGERY (CARDIOTHORACIC VASCULAR SURGERY)

## 2020-12-10 PROCEDURE — 85610 PROTHROMBIN TIME: CPT

## 2020-12-10 PROCEDURE — L8612 AQUEOUS SHUNT PROSTHESIS: HCPCS | Performed by: THORACIC SURGERY (CARDIOTHORACIC VASCULAR SURGERY)

## 2020-12-10 PROCEDURE — 6360000002 HC RX W HCPCS: Performed by: THORACIC SURGERY (CARDIOTHORACIC VASCULAR SURGERY)

## 2020-12-10 PROCEDURE — 86900 BLOOD TYPING SEROLOGIC ABO: CPT

## 2020-12-10 PROCEDURE — 06BP4ZZ EXCISION OF RIGHT SAPHENOUS VEIN, PERCUTANEOUS ENDOSCOPIC APPROACH: ICD-10-PCS | Performed by: THORACIC SURGERY (CARDIOTHORACIC VASCULAR SURGERY)

## 2020-12-10 PROCEDURE — 83735 ASSAY OF MAGNESIUM: CPT

## 2020-12-10 PROCEDURE — 6370000000 HC RX 637 (ALT 250 FOR IP): Performed by: THORACIC SURGERY (CARDIOTHORACIC VASCULAR SURGERY)

## 2020-12-10 PROCEDURE — 02100Z9 BYPASS CORONARY ARTERY, ONE ARTERY FROM LEFT INTERNAL MAMMARY, OPEN APPROACH: ICD-10-PCS | Performed by: THORACIC SURGERY (CARDIOTHORACIC VASCULAR SURGERY)

## 2020-12-10 PROCEDURE — 2720000010 HC SURG SUPPLY STERILE: Performed by: THORACIC SURGERY (CARDIOTHORACIC VASCULAR SURGERY)

## 2020-12-10 PROCEDURE — 6360000002 HC RX W HCPCS

## 2020-12-10 PROCEDURE — 2780000010 HC IMPLANT OTHER: Performed by: THORACIC SURGERY (CARDIOTHORACIC VASCULAR SURGERY)

## 2020-12-10 PROCEDURE — 85530 HEPARIN-PROTAMINE TOLERANCE: CPT | Performed by: THORACIC SURGERY (CARDIOTHORACIC VASCULAR SURGERY)

## 2020-12-10 PROCEDURE — 2580000003 HC RX 258: Performed by: THORACIC SURGERY (CARDIOTHORACIC VASCULAR SURGERY)

## 2020-12-10 PROCEDURE — 2500000003 HC RX 250 WO HCPCS: Performed by: NURSE ANESTHETIST, CERTIFIED REGISTERED

## 2020-12-10 PROCEDURE — 85730 THROMBOPLASTIN TIME PARTIAL: CPT

## 2020-12-10 PROCEDURE — 02HV33Z INSERTION OF INFUSION DEVICE INTO SUPERIOR VENA CAVA, PERCUTANEOUS APPROACH: ICD-10-PCS | Performed by: ANESTHESIOLOGY

## 2020-12-10 PROCEDURE — 6360000002 HC RX W HCPCS: Performed by: NURSE ANESTHETIST, CERTIFIED REGISTERED

## 2020-12-10 PROCEDURE — 82810 BLOOD GASES O2 SAT ONLY: CPT

## 2020-12-10 PROCEDURE — 0BH17EZ INSERTION OF ENDOTRACHEAL AIRWAY INTO TRACHEA, VIA NATURAL OR ARTIFICIAL OPENING: ICD-10-PCS | Performed by: THORACIC SURGERY (CARDIOTHORACIC VASCULAR SURGERY)

## 2020-12-10 PROCEDURE — 33518 CABG ARTERY-VEIN TWO: CPT | Performed by: THORACIC SURGERY (CARDIOTHORACIC VASCULAR SURGERY)

## 2020-12-10 PROCEDURE — 2580000003 HC RX 258: Performed by: NURSE ANESTHETIST, CERTIFIED REGISTERED

## 2020-12-10 PROCEDURE — 36415 COLL VENOUS BLD VENIPUNCTURE: CPT

## 2020-12-10 PROCEDURE — 85027 COMPLETE CBC AUTOMATED: CPT

## 2020-12-10 PROCEDURE — C1894 INTRO/SHEATH, NON-LASER: HCPCS | Performed by: THORACIC SURGERY (CARDIOTHORACIC VASCULAR SURGERY)

## 2020-12-10 PROCEDURE — 94002 VENT MGMT INPAT INIT DAY: CPT

## 2020-12-10 PROCEDURE — 5A1221Z PERFORMANCE OF CARDIAC OUTPUT, CONTINUOUS: ICD-10-PCS | Performed by: THORACIC SURGERY (CARDIOTHORACIC VASCULAR SURGERY)

## 2020-12-10 PROCEDURE — 2000000000 HC ICU R&B

## 2020-12-10 PROCEDURE — 94640 AIRWAY INHALATION TREATMENT: CPT

## 2020-12-10 PROCEDURE — 3700000000 HC ANESTHESIA ATTENDED CARE: Performed by: THORACIC SURGERY (CARDIOTHORACIC VASCULAR SURGERY)

## 2020-12-10 PROCEDURE — 85014 HEMATOCRIT: CPT

## 2020-12-10 PROCEDURE — 82374 ASSAY BLOOD CARBON DIOXIDE: CPT

## 2020-12-10 PROCEDURE — 86850 RBC ANTIBODY SCREEN: CPT

## 2020-12-10 PROCEDURE — 82565 ASSAY OF CREATININE: CPT

## 2020-12-10 PROCEDURE — 85347 COAGULATION TIME ACTIVATED: CPT | Performed by: THORACIC SURGERY (CARDIOTHORACIC VASCULAR SURGERY)

## 2020-12-10 PROCEDURE — P9045 ALBUMIN (HUMAN), 5%, 250 ML: HCPCS

## 2020-12-10 PROCEDURE — 80048 BASIC METABOLIC PNL TOTAL CA: CPT

## 2020-12-10 PROCEDURE — 2580000003 HC RX 258: Performed by: ANESTHESIOLOGY

## 2020-12-10 PROCEDURE — C1751 CATH, INF, PER/CENT/MIDLINE: HCPCS | Performed by: THORACIC SURGERY (CARDIOTHORACIC VASCULAR SURGERY)

## 2020-12-10 PROCEDURE — 33508 ENDOSCOPIC VEIN HARVEST: CPT | Performed by: THORACIC SURGERY (CARDIOTHORACIC VASCULAR SURGERY)

## 2020-12-10 PROCEDURE — 3600000008 HC SURGERY OHS BASE: Performed by: THORACIC SURGERY (CARDIOTHORACIC VASCULAR SURGERY)

## 2020-12-10 PROCEDURE — 82800 BLOOD PH: CPT

## 2020-12-10 PROCEDURE — 84295 ASSAY OF SERUM SODIUM: CPT

## 2020-12-10 PROCEDURE — 2709999900 HC NON-CHARGEABLE SUPPLY: Performed by: THORACIC SURGERY (CARDIOTHORACIC VASCULAR SURGERY)

## 2020-12-10 DEVICE — SUTURE TEMP PACE WIRE SZ 2-0 L24IN NONABSORBABLE LIGHT/DARK: Type: IMPLANTABLE DEVICE | Status: FUNCTIONAL

## 2020-12-10 DEVICE — DISK-SHAPED STYLE, SILICONE (1 PER STERILE PKG)
Type: IMPLANTABLE DEVICE | Status: FUNCTIONAL
Brand: SCANLAN® RADIOMARK® GRAFT MARKERS

## 2020-12-10 RX ORDER — SODIUM CHLORIDE 0.9 % (FLUSH) 0.9 %
10 SYRINGE (ML) INJECTION PRN
Status: DISCONTINUED | OUTPATIENT
Start: 2020-12-10 | End: 2020-12-10 | Stop reason: HOSPADM

## 2020-12-10 RX ORDER — ONDANSETRON 2 MG/ML
4 INJECTION INTRAMUSCULAR; INTRAVENOUS EVERY 8 HOURS PRN
Status: DISCONTINUED | OUTPATIENT
Start: 2020-12-10 | End: 2020-12-14 | Stop reason: HOSPADM

## 2020-12-10 RX ORDER — OXYCODONE HYDROCHLORIDE 5 MG/1
5 TABLET ORAL EVERY 4 HOURS PRN
Status: DISCONTINUED | OUTPATIENT
Start: 2020-12-10 | End: 2020-12-14 | Stop reason: HOSPADM

## 2020-12-10 RX ORDER — ACETAMINOPHEN 325 MG/1
650 TABLET ORAL EVERY 4 HOURS PRN
Status: DISCONTINUED | OUTPATIENT
Start: 2020-12-10 | End: 2020-12-14 | Stop reason: HOSPADM

## 2020-12-10 RX ORDER — SODIUM CHLORIDE 9 MG/ML
INJECTION, SOLUTION INTRAVENOUS CONTINUOUS
Status: DISCONTINUED | OUTPATIENT
Start: 2020-12-10 | End: 2020-12-10

## 2020-12-10 RX ORDER — FENTANYL CITRATE 50 UG/ML
25 INJECTION, SOLUTION INTRAMUSCULAR; INTRAVENOUS
Status: DISCONTINUED | OUTPATIENT
Start: 2020-12-10 | End: 2020-12-10 | Stop reason: HOSPADM

## 2020-12-10 RX ORDER — SUFENTANIL CITRATE 50 UG/ML
INJECTION EPIDURAL; INTRAVENOUS PRN
Status: DISCONTINUED | OUTPATIENT
Start: 2020-12-10 | End: 2020-12-10 | Stop reason: SDUPTHER

## 2020-12-10 RX ORDER — IPRATROPIUM BROMIDE AND ALBUTEROL SULFATE 2.5; .5 MG/3ML; MG/3ML
1 SOLUTION RESPIRATORY (INHALATION)
Status: DISCONTINUED | OUTPATIENT
Start: 2020-12-10 | End: 2020-12-14 | Stop reason: HOSPADM

## 2020-12-10 RX ORDER — 0.9 % SODIUM CHLORIDE 0.9 %
20 INTRAVENOUS SOLUTION INTRAVENOUS ONCE
Status: DISCONTINUED | OUTPATIENT
Start: 2020-12-10 | End: 2020-12-10

## 2020-12-10 RX ORDER — OXYCODONE HYDROCHLORIDE 5 MG/1
10 TABLET ORAL EVERY 4 HOURS PRN
Status: DISCONTINUED | OUTPATIENT
Start: 2020-12-10 | End: 2020-12-14 | Stop reason: HOSPADM

## 2020-12-10 RX ORDER — SODIUM CHLORIDE, SODIUM LACTATE, POTASSIUM CHLORIDE, CALCIUM CHLORIDE 600; 310; 30; 20 MG/100ML; MG/100ML; MG/100ML; MG/100ML
INJECTION, SOLUTION INTRAVENOUS CONTINUOUS
Status: DISCONTINUED | OUTPATIENT
Start: 2020-12-10 | End: 2020-12-10

## 2020-12-10 RX ORDER — ZOLPIDEM TARTRATE 5 MG/1
5 TABLET ORAL NIGHTLY PRN
Status: DISCONTINUED | OUTPATIENT
Start: 2020-12-11 | End: 2020-12-14 | Stop reason: HOSPADM

## 2020-12-10 RX ORDER — PROTAMINE SULFATE 10 MG/ML
50 INJECTION, SOLUTION INTRAVENOUS
Status: ACTIVE | OUTPATIENT
Start: 2020-12-10 | End: 2020-12-10

## 2020-12-10 RX ORDER — MORPHINE SULFATE 4 MG/ML
INJECTION, SOLUTION INTRAMUSCULAR; INTRAVENOUS
Status: COMPLETED
Start: 2020-12-10 | End: 2020-12-10

## 2020-12-10 RX ORDER — PROPOFOL 10 MG/ML
INJECTION, EMULSION INTRAVENOUS PRN
Status: DISCONTINUED | OUTPATIENT
Start: 2020-12-10 | End: 2020-12-10 | Stop reason: SDUPTHER

## 2020-12-10 RX ORDER — SODIUM CHLORIDE 0.9 % (FLUSH) 0.9 %
10 SYRINGE (ML) INJECTION EVERY 12 HOURS SCHEDULED
Status: DISCONTINUED | OUTPATIENT
Start: 2020-12-10 | End: 2020-12-14 | Stop reason: HOSPADM

## 2020-12-10 RX ORDER — FENTANYL CITRATE 50 UG/ML
50 INJECTION, SOLUTION INTRAMUSCULAR; INTRAVENOUS
Status: DISCONTINUED | OUTPATIENT
Start: 2020-12-10 | End: 2020-12-10 | Stop reason: HOSPADM

## 2020-12-10 RX ORDER — PROTAMINE SULFATE 10 MG/ML
INJECTION, SOLUTION INTRAVENOUS PRN
Status: DISCONTINUED | OUTPATIENT
Start: 2020-12-10 | End: 2020-12-10 | Stop reason: SDUPTHER

## 2020-12-10 RX ORDER — ROCURONIUM BROMIDE 10 MG/ML
INJECTION, SOLUTION INTRAVENOUS PRN
Status: DISCONTINUED | OUTPATIENT
Start: 2020-12-10 | End: 2020-12-10 | Stop reason: SDUPTHER

## 2020-12-10 RX ORDER — LIDOCAINE HYDROCHLORIDE 10 MG/ML
1 INJECTION, SOLUTION EPIDURAL; INFILTRATION; INTRACAUDAL; PERINEURAL
Status: DISCONTINUED | OUTPATIENT
Start: 2020-12-10 | End: 2020-12-10 | Stop reason: HOSPADM

## 2020-12-10 RX ORDER — HEPARIN SODIUM 1000 [USP'U]/ML
INJECTION, SOLUTION INTRAVENOUS; SUBCUTANEOUS PRN
Status: DISCONTINUED | OUTPATIENT
Start: 2020-12-10 | End: 2020-12-10 | Stop reason: SDUPTHER

## 2020-12-10 RX ORDER — ALBUMIN, HUMAN INJ 5% 5 %
25 SOLUTION INTRAVENOUS ONCE
Status: COMPLETED | OUTPATIENT
Start: 2020-12-10 | End: 2020-12-10

## 2020-12-10 RX ORDER — ASPIRIN 81 MG/1
81 TABLET ORAL DAILY
Status: DISCONTINUED | OUTPATIENT
Start: 2020-12-10 | End: 2020-12-14 | Stop reason: HOSPADM

## 2020-12-10 RX ORDER — MEPERIDINE HYDROCHLORIDE 50 MG/ML
25 INJECTION INTRAMUSCULAR; INTRAVENOUS; SUBCUTANEOUS
Status: ACTIVE | OUTPATIENT
Start: 2020-12-10 | End: 2020-12-10

## 2020-12-10 RX ORDER — DEXTROSE MONOHYDRATE 25 G/50ML
12.5 INJECTION, SOLUTION INTRAVENOUS PRN
Status: DISCONTINUED | OUTPATIENT
Start: 2020-12-10 | End: 2020-12-14 | Stop reason: HOSPADM

## 2020-12-10 RX ORDER — MORPHINE SULFATE 4 MG/ML
4 INJECTION, SOLUTION INTRAMUSCULAR; INTRAVENOUS
Status: DISCONTINUED | OUTPATIENT
Start: 2020-12-10 | End: 2020-12-14

## 2020-12-10 RX ORDER — MIDAZOLAM HYDROCHLORIDE 1 MG/ML
INJECTION INTRAMUSCULAR; INTRAVENOUS PRN
Status: DISCONTINUED | OUTPATIENT
Start: 2020-12-10 | End: 2020-12-10 | Stop reason: SDUPTHER

## 2020-12-10 RX ORDER — POTASSIUM CHLORIDE 29.8 MG/ML
10 INJECTION INTRAVENOUS PRN
Status: DISCONTINUED | OUTPATIENT
Start: 2020-12-10 | End: 2020-12-13

## 2020-12-10 RX ORDER — SODIUM CHLORIDE 450 MG/100ML
INJECTION, SOLUTION INTRAVENOUS CONTINUOUS PRN
Status: DISCONTINUED | OUTPATIENT
Start: 2020-12-10 | End: 2020-12-10 | Stop reason: SDUPTHER

## 2020-12-10 RX ORDER — INSULIN GLARGINE 100 [IU]/ML
0.15 INJECTION, SOLUTION SUBCUTANEOUS NIGHTLY
Status: DISCONTINUED | OUTPATIENT
Start: 2020-12-11 | End: 2020-12-14 | Stop reason: HOSPADM

## 2020-12-10 RX ORDER — ATORVASTATIN CALCIUM 20 MG/1
20 TABLET, FILM COATED ORAL NIGHTLY
Status: DISCONTINUED | OUTPATIENT
Start: 2020-12-11 | End: 2020-12-14 | Stop reason: HOSPADM

## 2020-12-10 RX ORDER — MIDAZOLAM HYDROCHLORIDE 1 MG/ML
2 INJECTION INTRAMUSCULAR; INTRAVENOUS
Status: DISCONTINUED | OUTPATIENT
Start: 2020-12-10 | End: 2020-12-10 | Stop reason: HOSPADM

## 2020-12-10 RX ORDER — EPHEDRINE SULFATE 50 MG/ML
INJECTION, SOLUTION INTRAVENOUS PRN
Status: DISCONTINUED | OUTPATIENT
Start: 2020-12-10 | End: 2020-12-10 | Stop reason: SDUPTHER

## 2020-12-10 RX ORDER — LIDOCAINE HYDROCHLORIDE 20 MG/ML
INJECTION, SOLUTION EPIDURAL; INFILTRATION; INTRACAUDAL; PERINEURAL PRN
Status: DISCONTINUED | OUTPATIENT
Start: 2020-12-10 | End: 2020-12-10 | Stop reason: SDUPTHER

## 2020-12-10 RX ORDER — 0.9 % SODIUM CHLORIDE 0.9 %
500 INTRAVENOUS SOLUTION INTRAVENOUS CONTINUOUS PRN
Status: DISCONTINUED | OUTPATIENT
Start: 2020-12-10 | End: 2020-12-14 | Stop reason: HOSPADM

## 2020-12-10 RX ORDER — MORPHINE SULFATE 4 MG/ML
2 INJECTION, SOLUTION INTRAMUSCULAR; INTRAVENOUS
Status: DISCONTINUED | OUTPATIENT
Start: 2020-12-10 | End: 2020-12-14

## 2020-12-10 RX ORDER — NICOTINE POLACRILEX 4 MG
15 LOZENGE BUCCAL PRN
Status: DISCONTINUED | OUTPATIENT
Start: 2020-12-10 | End: 2020-12-14 | Stop reason: HOSPADM

## 2020-12-10 RX ORDER — SODIUM CHLORIDE 0.9 % (FLUSH) 0.9 %
10 SYRINGE (ML) INJECTION PRN
Status: DISCONTINUED | OUTPATIENT
Start: 2020-12-10 | End: 2020-12-14 | Stop reason: HOSPADM

## 2020-12-10 RX ORDER — CLOPIDOGREL BISULFATE 75 MG/1
75 TABLET ORAL DAILY
Status: DISCONTINUED | OUTPATIENT
Start: 2020-12-11 | End: 2020-12-14 | Stop reason: HOSPADM

## 2020-12-10 RX ORDER — SODIUM CHLORIDE 0.9 % (FLUSH) 0.9 %
10 SYRINGE (ML) INJECTION EVERY 12 HOURS SCHEDULED
Status: DISCONTINUED | OUTPATIENT
Start: 2020-12-10 | End: 2020-12-10 | Stop reason: HOSPADM

## 2020-12-10 RX ORDER — METHYLENE BLUE 10 MG/ML
INJECTION INTRAVENOUS PRN
Status: DISCONTINUED | OUTPATIENT
Start: 2020-12-10 | End: 2020-12-10 | Stop reason: HOSPADM

## 2020-12-10 RX ORDER — SODIUM CHLORIDE 9 MG/ML
INJECTION, SOLUTION INTRAVENOUS CONTINUOUS
Status: DISCONTINUED | OUTPATIENT
Start: 2020-12-10 | End: 2020-12-14 | Stop reason: HOSPADM

## 2020-12-10 RX ORDER — ALBUMIN, HUMAN INJ 5% 5 %
SOLUTION INTRAVENOUS
Status: COMPLETED
Start: 2020-12-10 | End: 2020-12-10

## 2020-12-10 RX ORDER — DEXTROSE MONOHYDRATE 50 MG/ML
100 INJECTION, SOLUTION INTRAVENOUS PRN
Status: DISCONTINUED | OUTPATIENT
Start: 2020-12-10 | End: 2020-12-14 | Stop reason: HOSPADM

## 2020-12-10 RX ADMIN — MORPHINE SULFATE 4 MG: 4 INJECTION, SOLUTION INTRAMUSCULAR; INTRAVENOUS at 13:30

## 2020-12-10 RX ADMIN — SODIUM CHLORIDE: 4.5 INJECTION, SOLUTION INTRAVENOUS at 07:45

## 2020-12-10 RX ADMIN — SUFENTANIL CITRATE 20 MCG: 50 INJECTION, SOLUTION EPIDURAL; INTRAVENOUS at 12:14

## 2020-12-10 RX ADMIN — PHENYLEPHRINE HYDROCHLORIDE 300 MCG: 10 INJECTION INTRAVENOUS at 12:38

## 2020-12-10 RX ADMIN — SODIUM CHLORIDE: 9 INJECTION, SOLUTION INTRAVENOUS at 13:27

## 2020-12-10 RX ADMIN — HYDROMORPHONE HYDROCHLORIDE 1 MG: 1 INJECTION, SOLUTION INTRAMUSCULAR; INTRAVENOUS; SUBCUTANEOUS at 12:32

## 2020-12-10 RX ADMIN — LIDOCAINE HYDROCHLORIDE 5 ML: 20 INJECTION, SOLUTION EPIDURAL; INFILTRATION; INTRACAUDAL; PERINEURAL at 07:35

## 2020-12-10 RX ADMIN — EPHEDRINE SULFATE 20 MG: 50 INJECTION INTRAMUSCULAR; INTRAVENOUS; SUBCUTANEOUS at 08:47

## 2020-12-10 RX ADMIN — ALBUMIN (HUMAN) 25 G: 12.5 INJECTION, SOLUTION INTRAVENOUS at 13:29

## 2020-12-10 RX ADMIN — SODIUM CHLORIDE, SODIUM LACTATE, POTASSIUM CHLORIDE, AND CALCIUM CHLORIDE: 600; 310; 30; 20 INJECTION, SOLUTION INTRAVENOUS at 12:00

## 2020-12-10 RX ADMIN — ROCURONIUM BROMIDE 50 MG: 10 INJECTION, SOLUTION INTRAVENOUS at 08:08

## 2020-12-10 RX ADMIN — SUFENTANIL CITRATE 10 MCG: 50 INJECTION, SOLUTION EPIDURAL; INTRAVENOUS at 11:56

## 2020-12-10 RX ADMIN — PHENYLEPHRINE HYDROCHLORIDE 200 MCG: 10 INJECTION INTRAVENOUS at 12:46

## 2020-12-10 RX ADMIN — MIDAZOLAM 2 MG: 1 INJECTION INTRAMUSCULAR; INTRAVENOUS at 07:26

## 2020-12-10 RX ADMIN — SODIUM CHLORIDE, PRESERVATIVE FREE 10 ML: 5 INJECTION INTRAVENOUS at 20:39

## 2020-12-10 RX ADMIN — INSULIN LISPRO 1 UNITS: 100 INJECTION, SOLUTION INTRAVENOUS; SUBCUTANEOUS at 14:15

## 2020-12-10 RX ADMIN — SUFENTANIL CITRATE 20 MCG: 50 INJECTION, SOLUTION EPIDURAL; INTRAVENOUS at 08:13

## 2020-12-10 RX ADMIN — SUFENTANIL CITRATE 20 MCG: 50 INJECTION, SOLUTION EPIDURAL; INTRAVENOUS at 12:18

## 2020-12-10 RX ADMIN — ALBUMIN, HUMAN INJ 5% 25 G: 5 SOLUTION at 13:29

## 2020-12-10 RX ADMIN — ROCURONIUM BROMIDE 50 MG: 10 INJECTION, SOLUTION INTRAVENOUS at 07:35

## 2020-12-10 RX ADMIN — EPHEDRINE SULFATE 20 MG: 50 INJECTION INTRAMUSCULAR; INTRAVENOUS; SUBCUTANEOUS at 11:49

## 2020-12-10 RX ADMIN — SUFENTANIL CITRATE 20 MCG: 50 INJECTION, SOLUTION EPIDURAL; INTRAVENOUS at 08:15

## 2020-12-10 RX ADMIN — SUFENTANIL CITRATE 10 MCG: 50 INJECTION, SOLUTION EPIDURAL; INTRAVENOUS at 07:28

## 2020-12-10 RX ADMIN — PROTAMINE SULFATE 250 MG: 10 INJECTION, SOLUTION INTRAVENOUS at 11:54

## 2020-12-10 RX ADMIN — ROCURONIUM BROMIDE 50 MG: 10 INJECTION, SOLUTION INTRAVENOUS at 12:26

## 2020-12-10 RX ADMIN — ALBUMIN (HUMAN) 25 G: 12.5 INJECTION, SOLUTION INTRAVENOUS at 15:43

## 2020-12-10 RX ADMIN — OXYCODONE HYDROCHLORIDE 10 MG: 5 TABLET ORAL at 21:55

## 2020-12-10 RX ADMIN — Medication 2 G: at 07:43

## 2020-12-10 RX ADMIN — PHENYLEPHRINE HYDROCHLORIDE 100 MCG/MIN: 10 INJECTION INTRAVENOUS at 18:39

## 2020-12-10 RX ADMIN — SUFENTANIL CITRATE 20 MCG: 50 INJECTION, SOLUTION EPIDURAL; INTRAVENOUS at 12:05

## 2020-12-10 RX ADMIN — SUFENTANIL CITRATE 10 MCG: 50 INJECTION, SOLUTION EPIDURAL; INTRAVENOUS at 07:31

## 2020-12-10 RX ADMIN — SODIUM CHLORIDE, SODIUM LACTATE, POTASSIUM CHLORIDE, AND CALCIUM CHLORIDE: 600; 310; 30; 20 INJECTION, SOLUTION INTRAVENOUS at 06:18

## 2020-12-10 RX ADMIN — EPHEDRINE SULFATE 20 MG: 50 INJECTION INTRAMUSCULAR; INTRAVENOUS; SUBCUTANEOUS at 08:25

## 2020-12-10 RX ADMIN — ROCURONIUM BROMIDE 30 MG: 10 INJECTION, SOLUTION INTRAVENOUS at 10:51

## 2020-12-10 RX ADMIN — SUFENTANIL CITRATE 10 MCG: 50 INJECTION, SOLUTION EPIDURAL; INTRAVENOUS at 12:09

## 2020-12-10 RX ADMIN — HYDROMORPHONE HYDROCHLORIDE 1 MG: 1 INJECTION, SOLUTION INTRAMUSCULAR; INTRAVENOUS; SUBCUTANEOUS at 12:50

## 2020-12-10 RX ADMIN — Medication 2 G: at 19:00

## 2020-12-10 RX ADMIN — IPRATROPIUM BROMIDE AND ALBUTEROL SULFATE 1 AMPULE: .5; 3 SOLUTION RESPIRATORY (INHALATION) at 14:20

## 2020-12-10 RX ADMIN — EPHEDRINE SULFATE 10 MG: 50 INJECTION INTRAMUSCULAR; INTRAVENOUS; SUBCUTANEOUS at 08:52

## 2020-12-10 RX ADMIN — EPHEDRINE SULFATE 30 MG: 50 INJECTION INTRAMUSCULAR; INTRAVENOUS; SUBCUTANEOUS at 07:53

## 2020-12-10 RX ADMIN — SUFENTANIL CITRATE 20 MCG: 50 INJECTION, SOLUTION EPIDURAL; INTRAVENOUS at 11:58

## 2020-12-10 RX ADMIN — SODIUM CHLORIDE, SODIUM LACTATE, POTASSIUM CHLORIDE, AND CALCIUM CHLORIDE: 600; 310; 30; 20 INJECTION, SOLUTION INTRAVENOUS at 08:17

## 2020-12-10 RX ADMIN — SODIUM CHLORIDE, SODIUM LACTATE, POTASSIUM CHLORIDE, AND CALCIUM CHLORIDE: 600; 310; 30; 20 INJECTION, SOLUTION INTRAVENOUS at 07:45

## 2020-12-10 RX ADMIN — PHENYLEPHRINE HYDROCHLORIDE 300 MCG: 10 INJECTION INTRAVENOUS at 12:03

## 2020-12-10 RX ADMIN — PROPOFOL 100 MG: 10 INJECTION, EMULSION INTRAVENOUS at 07:35

## 2020-12-10 RX ADMIN — IPRATROPIUM BROMIDE AND ALBUTEROL SULFATE 1 AMPULE: .5; 3 SOLUTION RESPIRATORY (INHALATION) at 18:42

## 2020-12-10 RX ADMIN — MORPHINE SULFATE 4 MG: 4 INJECTION, SOLUTION INTRAMUSCULAR; INTRAVENOUS at 18:53

## 2020-12-10 RX ADMIN — SUFENTANIL CITRATE 30 MCG: 50 INJECTION, SOLUTION EPIDURAL; INTRAVENOUS at 08:17

## 2020-12-10 RX ADMIN — MIDAZOLAM 6 MG: 1 INJECTION INTRAMUSCULAR; INTRAVENOUS at 11:49

## 2020-12-10 RX ADMIN — HEPARIN SODIUM 45000 UNITS: 1000 INJECTION, SOLUTION INTRAVENOUS; SUBCUTANEOUS at 08:39

## 2020-12-10 RX ADMIN — SUFENTANIL CITRATE 10 MCG: 50 INJECTION, SOLUTION EPIDURAL; INTRAVENOUS at 08:10

## 2020-12-10 RX ADMIN — Medication 1 G: at 11:29

## 2020-12-10 RX ADMIN — ROCURONIUM BROMIDE 70 MG: 10 INJECTION, SOLUTION INTRAVENOUS at 11:25

## 2020-12-10 ASSESSMENT — PULMONARY FUNCTION TESTS
PIF_VALUE: 28
PIF_VALUE: 22
PIF_VALUE: 11
PIF_VALUE: 22
PIF_VALUE: 22
PIF_VALUE: 11
PIF_VALUE: 21
PIF_VALUE: 22
PIF_VALUE: 23
PIF_VALUE: 3.7
PIF_VALUE: 3.7
PIF_VALUE: 21
PIF_VALUE: 11
PIF_VALUE: 3.7
PIF_VALUE: 31
PIF_VALUE: 24
PIF_VALUE: 3.7
PIF_VALUE: 41
PIF_VALUE: 24
PIF_VALUE: 3.7
PIF_VALUE: 21
PIF_VALUE: 19
PIF_VALUE: 23
PIF_VALUE: 22
PIF_VALUE: 21
PIF_VALUE: 22
PIF_VALUE: 20
PIF_VALUE: 22
PIF_VALUE: 23
PIF_VALUE: 21
PIF_VALUE: 19
PIF_VALUE: 19
PIF_VALUE: 21
PIF_VALUE: 3.7
PIF_VALUE: 20
PIF_VALUE: 22
PIF_VALUE: 21

## 2020-12-10 ASSESSMENT — PAIN SCALES - GENERAL
PAINLEVEL_OUTOF10: 10
PAINLEVEL_OUTOF10: 7
PAINLEVEL_OUTOF10: 10

## 2020-12-10 ASSESSMENT — ENCOUNTER SYMPTOMS: SHORTNESS OF BREATH: 0

## 2020-12-10 ASSESSMENT — LIFESTYLE VARIABLES: SMOKING_STATUS: 1

## 2020-12-10 NOTE — INTERVAL H&P NOTE
Update History & Physical    The patient's History and Physical of November 24, 2020 was reviewed with the patient and I examined the patient. There was no change. The surgical site was confirmed by the patient and me. Plan: The risks, benefits, expected outcome, and alternative to the recommended procedure have been discussed with the patient. Patient understands and wants to proceed with the procedure.      Electronically signed by Mitzy Monsalve MD on 12/10/2020 at 6:23 AM

## 2020-12-10 NOTE — ANESTHESIA PROCEDURE NOTES
Central Venous Line:    A central venous line was placed using ultrasound guidance, in the OR for the following indication(s): central venous access and CVP monitoring. 12/10/2020 7:45 AM    Sterility preparation included the following: hand hygiene performed prior to procedure, maximum sterile barriers used and sterile technique used to drape from head to toe. The patient was placed in supine position. The right    The site was prepped with Chloraprep. A 8.5 Fr (size), 11.5 (length), introducer single lumen was placed. During the procedure, the following specific steps were taken: target vein identified, needle advanced into vein and blood aspirated and guidewire advanced into vein. Intravenous verification was obtained by ultrasound, venous blood return and CHAZ. Post insertion care included: all ports aspirated, all ports flushed easily, guidewire removed intact, Biopatch applied, line sutured in place and dressing applied. During the procedure the patient experienced: patient tolerated procedure well with no complications. Insertion site scrubbed per usage guidelines?: Yes  Skin prep agent dried for 3 minutes prior to procedure?:yes  Anesthesia type: generalA(n) non-oximetric, 7.5 (size) Pulmonary Artery Catheter (PAC) was placed through the Introducer CVL in the right internal jugular vein. The PAC placement was confirmed by pressure tracing changes and CHAZ and secured at 55 cm (depth). The patient experienced the following events during the procedure: patient tolerated procedure well with no complications. PA Cath placed?: Yes  Staffing  Anesthesiologist: Kailee Mccall DO  Performed: Anesthesiologist   Preanesthetic Checklist  Completed: patient identified, site marked, surgical consent, pre-op evaluation, timeout performed, IV checked, risks and benefits discussed, monitors and equipment checked, anesthesia consent given, oxygen available and patient being monitored

## 2020-12-10 NOTE — ANESTHESIA POSTPROCEDURE EVALUATION
Department of Anesthesiology  Postprocedure Note    Patient: Glenn Collins  MRN: [id number]  [place]: 1960  Date of evaluation: 12/10/2020  Time:  1:06 PM     Procedure Summary     Date:  12/10/20 Room / Location:  [facility] OR [address]    Anesthesia Start:  6532 Anesthesia Stop:      Procedure:  CABG CORONARY ARTERY BYPASS GRAFT X3  WITH INTERNAL MAMMARY ARTERY, ENDOSCOPIC VEIN HARVEST,OPEN VEIN HARVEST, WITH PERFUSION. TRANSESOPHOGEAL ECHOCARDIOGRAM (N/A ) Diagnosis:  (CAD)    Surgeon:  Boom Almanza MD Responsible Provider:  MUNA Meier CRNA    Anesthesia Type:  general ASA Status:  4          Anesthesia Type: general    Rei Phase I: Rei Score: 10    Rei Phase II:      Last vitals: Reviewed and per EMR flowsheets.        Anesthesia Post Evaluation    Patient location during evaluation: ICU  Patient participation: complete - patient cannot participate  Level of consciousness: sedated and ventilated  Airway patency: patent  Nausea & Vomiting: no nausea and no vomiting  Complications: no  Cardiovascular status: hemodynamically stable  Respiratory status: ventilator and intubated  Hydration status: euvolemic

## 2020-12-10 NOTE — BRIEF OP NOTE
Brief Postoperative Note      Patient: Candace Or  YOB: 1960  MRN: [id number]    Date of Procedure: 12/10/2020    Pre-Op Diagnosis: CAD,  ischemic cardiomyopathy    Post-Op Diagnosis: Same       Procedure(s):  CABG CORONARY ARTERY BYPASS GRAFT X3  WITH INTERNAL MAMMARY ARTERY, ENDOSCOPIC VEIN HARVEST,OPEN VEIN HARVEST, WITH PERFUSION. TRANSESOPHOGEAL ECHOCARDIOGRAM    Surgeon(s):  Rayna Armendariz MD    Assistant:  * No surgical staff found *    Anesthesia: General    Estimated Blood Loss (mL): N/A    Complications: None    Specimens:   * No specimens in log *    Implants:  Implant Name Type Inv. Item Serial No.  Lot No. LRB No. Used Action   MARKER GRFT AMBER DISK LTWT BIOCOMPATIBLE RADPQ DISP  MARKER GRFT AMBER DISK LTWT BIOCOMPATIBLE RADPQ DISP  [facility] Market Force Information-WD [id number] N/A 1 Implanted   MARKER GRFT AMBER DISK LTWT BIOCOMPATIBLE RADPQ DISP  MARKER GRFT AMBER DISK LTWT BIOCOMPATIBLE RADPQ DISP  [facility] Market Force Information-WD [id number] N/A 1 Implanted         Drains:   Chest Tube 1 Mediastinal 19 Upper sorbian (Active)       Chest Tube 2 Mediastinal 19 Upper sorbian (Active)       Chest Tube 3 Mediastinal 24 Upper sorbian (Active)       Urethral Catheter Temperature probe 16 fr (Active)       Findings: severe dilated cardiomyopathy. Cardiomegaly. Severe CAD.  Atherosclerotic ascending  aorta    Electronically signed by Rayna Armendariz MD on 12/10/2020 at 12:39 PM

## 2020-12-10 NOTE — ANESTHESIA PRE PROCEDURE
Department of Anesthesiology  Preprocedure Note       Name:  Glenn Collins   Age:  60 y.o.  :  1960                                          MRN:  [id number]         Date:  12/10/2020      Surgeon: Any Waldron):  Boom Almanza MD    Procedure: Procedure(s):  CABG CORONARY ARTERY BYPASS    Medications prior to admission:   Prior to Admission medications    Medication Sig Start Date End Date Taking? Authorizing Provider   metFORMIN (GLUCOPHAGE) 500 MG tablet Take 500 mg by mouth 2 times daily (with meals)   Yes Historical Provider, MD   ALPRAZolam (XANAX) 1 MG tablet Take 1 mg by mouth 2 times daily.  For 30 days   Yes Historical Provider, MD   amLODIPine (NORVASC) 5 MG tablet Take 5 mg by mouth daily For 30 days   Yes Historical Provider, MD   aspirin EC 81 MG EC tablet Take 81 mg by mouth daily   Yes Historical Provider, MD   carvedilol (COREG) 3.125 MG tablet Take 3.125 mg by mouth 2 times daily (with meals)   Yes Historical Provider, MD   DULoxetine (CYMBALTA) 30 MG extended release capsule Take 30 mg by mouth daily For 30 days   Yes Historical Provider, MD   sacubitril-valsartan (ENTRESTO) 24-26 MG per tablet Take 1 tablet by mouth 2 times daily   Yes Historical Provider, MD   mirtazapine (REMERON) 15 MG tablet Take 45 mg by mouth nightly    Yes Historical Provider, MD   rosuvastatin (CRESTOR) 10 MG tablet Take 10 mg by mouth nightly For 30 days    Yes Historical Provider, MD   cilostazol (PLETAL) 100 MG tablet Take 100 mg by mouth 2 times daily For 30 days    Historical Provider, MD   tiZANidine (ZANAFLEX) 4 MG tablet Take 4 mg by mouth every 8 hours as needed For 20 days    Historical Provider, MD       Current medications:    Current Facility-Administered Medications   Medication Dose Route Frequency Provider Last Rate Last Dose    ceFAZolin (ANCEF) 2 g in 0.9% sodium chloride 50 mL IVPB  2 g Intravenous On Call to 101 Dates MD Eric        lactated ringers infusion   Intravenous Continuous [facility] Josie Khan  mL/hr at 12/10/20 0618      0.9 % sodium chloride bolus  20 mL Intravenous Once Ester Jaime MD           Allergies:  No Known Allergies    Problem List:    Patient Active Problem List   Diagnosis Code    CAD in native artery I25.10       Past Medical History:        Diagnosis Date    CAD (coronary artery disease)     Diabetes mellitus ([address]     no meds at present   [facility] (hard of hearing)     very [place] with left hearing aid    Hyperlipidemia     Hypertension        Past Surgical History:        Procedure Laterality Date    CARDIAC CATHETERIZATION  11/2020    done in [facility] ELBOW SURGERY Right     FINGER SURGERY      right middle       Social History:    Social History     Tobacco Use    Smoking status: Current Every Day Smoker     Packs/day: 0.25     Years: 30.00     Pack years: 7.50     Types: Cigarettes    Smokeless tobacco: Never Used   Substance Use Topics    Alcohol use: Not Currently                                Ready to quit: Not Answered  Counseling given: Not Answered      Vital Signs (Current):   Vitals:    12/10/20 0557   BP: 121/84   Pulse: 86   Resp: 18   Temp: 98 °F (36.7 °C)   TempSrc: Temporal   SpO2: 97%   Weight: 238 lb (108 kg)   Height: 5' 11\" (1.803 m)                                              BP Readings from Last 3 Encounters:   12/10/20 121/84   11/24/20 110/80       NPO Status: Time of last liquid consumption: 0000                        Time of last solid consumption: 0000                        Date of last liquid consumption: 12/09/20                        Date of last solid food consumption: 12/09/20    BMI:   Wt Readings from Last 3 Encounters:   12/10/20 238 lb (108 kg)   12/09/20 238 lb (108 kg)   11/24/20 244 lb (110.7 kg)     Body mass index is 33.19 kg/m².     CBC:   Lab Results   Component Value Date    WBC 7.1 12/09/2020    RBC 5.57 12/09/2020    HGB 16.5 12/09/2020    HCT 51.3 12/09/2020    MCV 92.1 12/09/2020    RDW 12.9 12/09/2020  2020       CMP:   Lab Results   Component Value Date     2020    K 3.9 2020     2020    CO2 23 2020    BUN 7 2020    CREATININE 0.7 2020    GFRAA >59 2020    LABGLOM >60 2020    GLUCOSE 177 2020    PROT 7.5 2020    CALCIUM 9.2 2020    BILITOT 0.3 2020    ALKPHOS 107 2020    AST 24 2020    ALT 23 2020       POC Tests: No results for input(s): POCGLU, POCNA, POCK, POCCL, POCBUN, POCHEMO, POCHCT in the last 72 hours. Coags:   Lab Results   Component Value Date    PROTIME 13.3 2020    INR 1.02 2020       HCG (If Applicable): No results found for: PREGTESTUR, PREGSERUM, HCG, HCGQUANT     ABGs: No results found for: PHART, PO2ART, OKE7DYS, OXX2YXC, BEART, D7WBKLDW     Type & Screen (If Applicable):  No results found for: LABABO, LABRH    Drug/Infectious Status (If Applicable):  No results found for: HIV, HEPCAB    COVID-19 Screening (If Applicable):   Lab Results   Component Value Date    COVID19 Not Detected 2020         Anesthesia Evaluation  Patient summary reviewed and Nursing notes reviewed no history of anesthetic complications:   Airway: Mallampati: I  TM distance: >3 FB   Neck ROM: full  Mouth opening: > = 3 FB Dental:          Pulmonary:   (+) current smoker    (-) shortness of breath and sleep apnea          Patient did not smoke on day of surgery. ROS comment: CXR:  1. Chronic lung changes with no acute disease.     Cardiovascular:  Exercise tolerance: good (>4 METS),   (+) hypertension:, past MI: 0-3 months, CAD: obstructive, CHF: systolic, hyperlipidemia    (-) pacemaker    ECG reviewed          Cleared by cardiology     Beta Blocker:  Dose within 24 Hrs      ROS comment: EK BPM  Sinus rhythm  Left axis deviation  Extensive infarct, anterior and inferior lateral involvement- age undetermined  Low QRS voltages in precordial leads  Comparison Summary: No serial comparison made  Summary: Abnormal ECG    \"An echocardiogram was performed that demonstrated evidence of a dilated cardiomyopathy with a very low ejection fraction of 30%. For this reason the patient underwent cardiac catheterization on 11/20/2020. This reveals a left ventricle that shows a large area of anterior apical and inferior apical wall akinesis with a decreased ejection fraction of only 20%\"    Ischemic cardiomyopathy with EF of 20% on heart cath     Neuro/Psych:               GI/Hepatic/Renal:        (-) GERD, liver disease and no renal disease      ROS comment: Denies dysphagia. Endo/Other:    (+) Diabetes (borderline treated with metformin)Type II DM, , no malignancy/cancer. (-) no malignancy/cancer               Abdominal:           Vascular:     - DVT and PE. Anesthesia Plan      general     ASA 4     (Pt is very hard of hearing.)  Induction: intravenous. arterial line, BIS, central line, CVP, PA catheter and CHAZ  MIPS: Postoperative opioids intended, Prophylactic antiemetics administered and Postoperative ventilation. Anesthetic plan and risks discussed with patient. Use of blood products discussed with patient whom consented to blood products.                Catalina Barraza DO   12/10/2020

## 2020-12-10 NOTE — ANESTHESIA PROCEDURE NOTES
Procedure Performed: CHAZ       Start Time:  12/10/2020 8:29 AM       End Time:   12/10/2020 8:29 AM    Preanesthesia Checklist:  Patient identified, IV assessed, risks and benefits discussed, monitors and equipment assessed, procedure being performed at surgeon's request and anesthesia consent obtained. General Procedure Information  Diagnostic Indications for Echo:  assessment of ascending aorta, assessment of surgical repair, hemodynamic monitoring and assessment of valve function  Physician Requesting Echo: Don Jacinto MD  Location performed:  OR  Intubated  Bite block placed  Heart visualized  Probe Insertion:  Easy  Probe Type:  Mulitplane  Modalities:  2D only and color flow mapping    Echocardiographic and Doppler Measurements    Ventricles    Right Ventricle:  Cavity size normal.  Hypertrophy not present. Thrombus not present. Global function severely impaired. Left Ventricle:  Cavity size dilated. Hypertrophy present. Thrombus not present. Global Function severely impaired. Ejection Fraction 20%. Ventricular Regional Function:  1- Basal Anteroseptal:  hypokinetic  2- Basal Anterior:  hypokinetic  3- Basal Anterolateral:  hypokinetic  4- Basal Inferolateral:  hypokinetic  5- Basal Inferior:  hypokinetic  6- Basal Inferoseptal:  hypokinetic  7- Mid Anteroseptal:  hypokinetic  8- Mid Anterior:  hypokinetic  9- Mid Anterolateral:  hypokinetic  10- Mid Inferolateral:  hypokinetic  11- Mid Inferior:  hypokinetic  12- Mid Inferoseptal:  hypokinetic  13- Apical Anterior:  hypokinetic  14- Apical Lateral:  hypokinetic  15- Apical Inferior:  hypokinetic  16- Apical Septal:  hypokinetic  17- [place]:  hypokinetic      Valves    Aortic Valve: Annulus normal.  Stenosis not present. Regurgitation none. Leaflets normal.  Leaflet motions normal.      Mitral Valve: Annulus normal.  Stenosis not present. Regurgitation mild. Leaflets normal.  Leaflet motions normal.      Tricuspid Valve:   Annulus normal.  Stenosis not present. Regurgitation none. Leaflet motions normal.    Pulmonic Valve: Annulus normal.  Stenosis not present. Regurgitation none. Aorta    Ascending Aorta:  Size normal.  Dissection not present. Aortic Arch:  Size normal.  Dissection not present. Descending Aorta:  Size normal.  Dissection not present. Atria    Right Atrium:  Size normal.  Spontaneous echo contrast not present. Thrombus not present. Tumor not present. Device present. Left Atrium:  Size normal.  Spontaneous echo contrast present. Thrombus not present. Tumor not present. Device not present. Left atrial appendage normal.      Septa    Atrial Septum:  Intra-atrial septal morphology lipomatous hypertrophy. Ventricular Septum:  Intra-ventricular septum morphology hypertrophy.           Other Findings  Pericardium:  normal  Pleural Effusion:  none      Anesthesia Information  Performed Personally  Anesthesiologist:  Em Headley DO

## 2020-12-10 NOTE — PROGRESS NOTES
Received to ICU from OR post op CABG. Placed on ICU monitors and ventilator. Condition serious but stable. MCT and pleural CT to -20 dry suction. Minimal dark red output. Dressings D/I. No responses elicited at this time.

## 2020-12-10 NOTE — ANESTHESIA PROCEDURE NOTES
Arterial Line:    An arterial line was placed using ultrasound guidance and surface landmarks, in the pre-op for the following indication(s): continuous blood pressure monitoring and blood sampling needed. A 20 gauge (size), 1 and 3/4 inch (length), Arrow (type) catheter was placed, Seldinger technique used, into the left radial artery and a KakKstati Arterial Cannulation Support device was used for positioning, secured by tape and Tegaderm. Anesthesia type: Local  Local infiltration: None    Events:  patient tolerated procedure well with no complications.   12/10/2020 7:05 AM12/10/2020 7:05 AM  Anesthesiologist: Awanda Schirmer, DO  Performed: Anesthesiologist   Preanesthetic Checklist  Completed: patient identified, site marked, surgical consent, pre-op evaluation, timeout performed, IV checked, risks and benefits discussed, monitors and equipment checked, anesthesia consent given, oxygen available and patient being monitored

## 2020-12-11 ENCOUNTER — APPOINTMENT (OUTPATIENT)
Dept: GENERAL RADIOLOGY | Age: 60
DRG: 236 | End: 2020-12-11
Attending: THORACIC SURGERY (CARDIOTHORACIC VASCULAR SURGERY)
Payer: COMMERCIAL

## 2020-12-11 LAB
ANION GAP SERPL CALCULATED.3IONS-SCNC: 8 MMOL/L (ref 7–19)
ANION GAP SERPL CALCULATED.3IONS-SCNC: 8 MMOL/L (ref 7–19)
BASE EXCESS ARTERIAL: 0.5 MMOL/L (ref -2–2)
BUN BLDV-MCNC: 10 MG/DL (ref 8–23)
BUN BLDV-MCNC: 9 MG/DL (ref 8–23)
CALCIUM SERPL-MCNC: 7.6 MG/DL (ref 8.8–10.2)
CALCIUM SERPL-MCNC: 7.8 MG/DL (ref 8.8–10.2)
CARBOXYHEMOGLOBIN ARTERIAL: 2.4 % (ref 0–5)
CHLORIDE BLD-SCNC: 105 MMOL/L (ref 98–111)
CHLORIDE BLD-SCNC: 110 MMOL/L (ref 98–111)
CO2: 24 MMOL/L (ref 22–29)
CO2: 26 MMOL/L (ref 22–29)
CREAT SERPL-MCNC: 0.5 MG/DL (ref 0.5–1.2)
CREAT SERPL-MCNC: 0.5 MG/DL (ref 0.5–1.2)
EKG P AXIS: 34 DEGREES
EKG P-R INTERVAL: 192 MS
EKG Q-T INTERVAL: 354 MS
EKG QRS DURATION: 96 MS
EKG QTC CALCULATION (BAZETT): 422 MS
EKG T AXIS: 67 DEGREES
GFR AFRICAN AMERICAN: >59
GFR AFRICAN AMERICAN: >59
GFR NON-AFRICAN AMERICAN: >60
GFR NON-AFRICAN AMERICAN: >60
GLUCOSE BLD-MCNC: 123 MG/DL (ref 70–99)
GLUCOSE BLD-MCNC: 135 MG/DL (ref 74–109)
GLUCOSE BLD-MCNC: 138 MG/DL (ref 70–99)
GLUCOSE BLD-MCNC: 146 MG/DL (ref 70–99)
GLUCOSE BLD-MCNC: 146 MG/DL (ref 70–99)
GLUCOSE BLD-MCNC: 150 MG/DL (ref 74–109)
GLUCOSE BLD-MCNC: 151 MG/DL (ref 70–99)
GLUCOSE BLD-MCNC: 177 MG/DL (ref 70–99)
HCO3 ARTERIAL: 25.4 MMOL/L (ref 22–26)
HCT VFR BLD CALC: 30.9 % (ref 42–52)
HCT VFR BLD CALC: 32.5 % (ref 42–52)
HCT VFR BLD CALC: 34 % (ref 42–52)
HEMOGLOBIN, ART, EXTENDED: 10.8 G/DL (ref 14–18)
HEMOGLOBIN: 10 G/DL (ref 14–18)
HEMOGLOBIN: 10.5 G/DL (ref 14–18)
HEMOGLOBIN: 10.7 G/DL (ref 14–18)
MAGNESIUM: 1.9 MG/DL (ref 1.6–2.4)
MCH RBC QN AUTO: 29.4 PG (ref 27–31)
MCH RBC QN AUTO: 30.1 PG (ref 27–31)
MCH RBC QN AUTO: 30.1 PG (ref 27–31)
MCHC RBC AUTO-ENTMCNC: 31.5 G/DL (ref 33–37)
MCHC RBC AUTO-ENTMCNC: 32.3 G/DL (ref 33–37)
MCHC RBC AUTO-ENTMCNC: 32.4 G/DL (ref 33–37)
MCV RBC AUTO: 93.1 FL (ref 80–94)
MCV RBC AUTO: 93.1 FL (ref 80–94)
MCV RBC AUTO: 93.4 FL (ref 80–94)
METHEMOGLOBIN ARTERIAL: 1.1 %
O2 CONTENT ARTERIAL: 14.3 ML/DL
O2 SAT, ARTERIAL: 93.7 %
O2 THERAPY: ABNORMAL
PCO2 ARTERIAL: 41 MMHG (ref 35–45)
PDW BLD-RTO: 13.2 % (ref 11.5–14.5)
PDW BLD-RTO: 13.3 % (ref 11.5–14.5)
PDW BLD-RTO: 13.3 % (ref 11.5–14.5)
PERFORMED ON: ABNORMAL
PH ARTERIAL: 7.4 (ref 7.35–7.45)
PLATELET # BLD: 72 K/UL (ref 130–400)
PLATELET # BLD: 83 K/UL (ref 130–400)
PLATELET # BLD: 85 K/UL (ref 130–400)
PMV BLD AUTO: 11.6 FL (ref 9.4–12.4)
PMV BLD AUTO: 11.7 FL (ref 9.4–12.4)
PMV BLD AUTO: 11.8 FL (ref 9.4–12.4)
PO2 ARTERIAL: 61 MMHG (ref 80–100)
POTASSIUM SERPL-SCNC: 3.4 MMOL/L (ref 3.5–5)
POTASSIUM SERPL-SCNC: 4 MMOL/L (ref 3.5–5)
POTASSIUM SERPL-SCNC: 4.1 MMOL/L (ref 3.5–5)
POTASSIUM, WHOLE BLOOD: 3.8
RBC # BLD: 3.32 M/UL (ref 4.7–6.1)
RBC # BLD: 3.49 M/UL (ref 4.7–6.1)
RBC # BLD: 3.64 M/UL (ref 4.7–6.1)
SODIUM BLD-SCNC: 139 MMOL/L (ref 136–145)
SODIUM BLD-SCNC: 142 MMOL/L (ref 136–145)
WBC # BLD: 10.1 K/UL (ref 4.8–10.8)
WBC # BLD: 8.9 K/UL (ref 4.8–10.8)
WBC # BLD: 9.4 K/UL (ref 4.8–10.8)

## 2020-12-11 PROCEDURE — 99024 POSTOP FOLLOW-UP VISIT: CPT | Performed by: THORACIC SURGERY (CARDIOTHORACIC VASCULAR SURGERY)

## 2020-12-11 PROCEDURE — 6370000000 HC RX 637 (ALT 250 FOR IP): Performed by: THORACIC SURGERY (CARDIOTHORACIC VASCULAR SURGERY)

## 2020-12-11 PROCEDURE — 84132 ASSAY OF SERUM POTASSIUM: CPT

## 2020-12-11 PROCEDURE — 93005 ELECTROCARDIOGRAM TRACING: CPT | Performed by: THORACIC SURGERY (CARDIOTHORACIC VASCULAR SURGERY)

## 2020-12-11 PROCEDURE — 2580000003 HC RX 258: Performed by: THORACIC SURGERY (CARDIOTHORACIC VASCULAR SURGERY)

## 2020-12-11 PROCEDURE — 2000000000 HC ICU R&B

## 2020-12-11 PROCEDURE — 83735 ASSAY OF MAGNESIUM: CPT

## 2020-12-11 PROCEDURE — 2500000003 HC RX 250 WO HCPCS: Performed by: THORACIC SURGERY (CARDIOTHORACIC VASCULAR SURGERY)

## 2020-12-11 PROCEDURE — 85027 COMPLETE CBC AUTOMATED: CPT

## 2020-12-11 PROCEDURE — 37799 UNLISTED PX VASCULAR SURGERY: CPT

## 2020-12-11 PROCEDURE — 71045 X-RAY EXAM CHEST 1 VIEW: CPT

## 2020-12-11 PROCEDURE — 82803 BLOOD GASES ANY COMBINATION: CPT

## 2020-12-11 PROCEDURE — 80048 BASIC METABOLIC PNL TOTAL CA: CPT

## 2020-12-11 PROCEDURE — 82947 ASSAY GLUCOSE BLOOD QUANT: CPT

## 2020-12-11 PROCEDURE — 6360000002 HC RX W HCPCS: Performed by: THORACIC SURGERY (CARDIOTHORACIC VASCULAR SURGERY)

## 2020-12-11 PROCEDURE — 94640 AIRWAY INHALATION TREATMENT: CPT

## 2020-12-11 PROCEDURE — 2700000000 HC OXYGEN THERAPY PER DAY

## 2020-12-11 RX ORDER — BUMETANIDE 0.25 MG/ML
1 INJECTION, SOLUTION INTRAMUSCULAR; INTRAVENOUS EVERY 12 HOURS
Status: DISCONTINUED | OUTPATIENT
Start: 2020-12-11 | End: 2020-12-13

## 2020-12-11 RX ADMIN — Medication 2 G: at 03:15

## 2020-12-11 RX ADMIN — CLOPIDOGREL BISULFATE 75 MG: 75 TABLET ORAL at 07:46

## 2020-12-11 RX ADMIN — SODIUM CHLORIDE, PRESERVATIVE FREE 10 ML: 5 INJECTION INTRAVENOUS at 21:21

## 2020-12-11 RX ADMIN — INSULIN LISPRO 1 UNITS: 100 INJECTION, SOLUTION INTRAVENOUS; SUBCUTANEOUS at 17:09

## 2020-12-11 RX ADMIN — IPRATROPIUM BROMIDE AND ALBUTEROL SULFATE 1 AMPULE: .5; 3 SOLUTION RESPIRATORY (INHALATION) at 06:25

## 2020-12-11 RX ADMIN — ATORVASTATIN CALCIUM 20 MG: 20 TABLET, FILM COATED ORAL at 21:20

## 2020-12-11 RX ADMIN — BUMETANIDE 1 MG: 0.25 INJECTION, SOLUTION INTRAMUSCULAR; INTRAVENOUS at 21:20

## 2020-12-11 RX ADMIN — IPRATROPIUM BROMIDE AND ALBUTEROL SULFATE 1 AMPULE: .5; 3 SOLUTION RESPIRATORY (INHALATION) at 14:40

## 2020-12-11 RX ADMIN — SACUBITRIL AND VALSARTAN 1 TABLET: 24; 26 TABLET, FILM COATED ORAL at 21:20

## 2020-12-11 RX ADMIN — INSULIN LISPRO 1 UNITS: 100 INJECTION, SOLUTION INTRAVENOUS; SUBCUTANEOUS at 09:50

## 2020-12-11 RX ADMIN — ASPIRIN 81 MG: 81 TABLET, COATED ORAL at 07:44

## 2020-12-11 RX ADMIN — BUMETANIDE 1 MG: 0.25 INJECTION, SOLUTION INTRAMUSCULAR; INTRAVENOUS at 07:44

## 2020-12-11 RX ADMIN — INSULIN GLARGINE 16 UNITS: 100 INJECTION, SOLUTION SUBCUTANEOUS at 21:21

## 2020-12-11 RX ADMIN — Medication 2 G: at 12:29

## 2020-12-11 RX ADMIN — OXYCODONE HYDROCHLORIDE 10 MG: 5 TABLET ORAL at 17:00

## 2020-12-11 RX ADMIN — INSULIN LISPRO 1 UNITS: 100 INJECTION, SOLUTION INTRAVENOUS; SUBCUTANEOUS at 12:13

## 2020-12-11 RX ADMIN — OXYCODONE HYDROCHLORIDE 10 MG: 5 TABLET ORAL at 21:21

## 2020-12-11 RX ADMIN — IPRATROPIUM BROMIDE AND ALBUTEROL SULFATE 1 AMPULE: .5; 3 SOLUTION RESPIRATORY (INHALATION) at 10:15

## 2020-12-11 RX ADMIN — SODIUM CHLORIDE: 9 INJECTION, SOLUTION INTRAVENOUS at 02:22

## 2020-12-11 RX ADMIN — SODIUM CHLORIDE, PRESERVATIVE FREE 10 ML: 5 INJECTION INTRAVENOUS at 07:44

## 2020-12-11 RX ADMIN — IPRATROPIUM BROMIDE AND ALBUTEROL SULFATE 1 AMPULE: .5; 3 SOLUTION RESPIRATORY (INHALATION) at 18:21

## 2020-12-11 RX ADMIN — OXYCODONE HYDROCHLORIDE 10 MG: 5 TABLET ORAL at 12:30

## 2020-12-11 RX ADMIN — POTASSIUM CHLORIDE 10 MEQ: 29.8 INJECTION, SOLUTION INTRAVENOUS at 21:20

## 2020-12-11 RX ADMIN — Medication 2 G: at 21:20

## 2020-12-11 RX ADMIN — MORPHINE SULFATE 2 MG: 4 INJECTION, SOLUTION INTRAMUSCULAR; INTRAVENOUS at 04:53

## 2020-12-11 RX ADMIN — OXYCODONE HYDROCHLORIDE 10 MG: 5 TABLET ORAL at 02:21

## 2020-12-11 RX ADMIN — SACUBITRIL AND VALSARTAN 1 TABLET: 24; 26 TABLET, FILM COATED ORAL at 07:44

## 2020-12-11 ASSESSMENT — PAIN DESCRIPTION - ORIENTATION
ORIENTATION: MID

## 2020-12-11 ASSESSMENT — PAIN DESCRIPTION - PAIN TYPE
TYPE: SURGICAL PAIN

## 2020-12-11 ASSESSMENT — PAIN SCALES - GENERAL
PAINLEVEL_OUTOF10: 9
PAINLEVEL_OUTOF10: 6
PAINLEVEL_OUTOF10: 3
PAINLEVEL_OUTOF10: 5
PAINLEVEL_OUTOF10: 3
PAINLEVEL_OUTOF10: 7
PAINLEVEL_OUTOF10: 6
PAINLEVEL_OUTOF10: 7
PAINLEVEL_OUTOF10: 7
PAINLEVEL_OUTOF10: 3
PAINLEVEL_OUTOF10: 5
PAINLEVEL_OUTOF10: 9
PAINLEVEL_OUTOF10: 4
PAINLEVEL_OUTOF10: 7

## 2020-12-11 ASSESSMENT — PAIN DESCRIPTION - LOCATION
LOCATION: STERNUM

## 2020-12-11 NOTE — PROGRESS NOTES
CVTS Staff Post-op check    Doing well  On CPAP  AVSS  Low chest tube drainage  Good u/o    ABG and then plan for extubation  ICU management

## 2020-12-11 NOTE — PROGRESS NOTES
12/10/2020  8:12 PM - Slick Wolf Incoming Lab Results From [employer]  Value  Ref Range & Units  Status  Collected  Lab    pH, Arterial  7.310Low    7.350 - 7.450  Final  12/10/2020  8:11 PM  [facility] Lab    pCO2, Arterial  51. 0High    35.0 - 45.0 mmHg  Final  12/10/2020  8:11 PM  [address] [place] [address] Lab    pO2, Arterial  66. 0Low    80.0 - 100.0 mmHg  Final  12/10/2020  8:11 PM  [address] [place] [address] Lab    HCO3, Arterial  25.7  22.0 - 26.0 mmol/L  Final  12/10/2020  8:11 PM  [facility] Excess, Arterial  -1.0  -2.0 - 2.0 mmol/L  Final  12/10/2020  8:11 PM  [address] [place] [address] Lab    Hemoglobin, Art, Extended  11.1Low    14.0 - 18.0 g/dL  Final  12/10/2020  8:11 PM  [address] [place] [address] Lab    O2 Sat, Arterial  92.9  >92 %  Final  12/10/2020  8:11 PM  [address] [place] [address] Lab    Carboxyhgb, Arterial  2.2  0.0 - 5.0 %  Final  12/10/2020  8:11 PM  [facility] Lab         0.0-1.5   (Smokers 1.5-5.0)    Methemoglobin, Arterial  1.3  <1.5 %  Final  12/10/2020  8:11 PM  [address] [place] [address] Lab    O2 Content, Arterial  14.5  Not Established mL/dL  Final  12/10/2020  8:11 PM  [address] [place] [address] Lab    O2 Therapy  Unknown   Final  12/10/2020  8:11 PM  [facility] Performed By     Heron Yo  Name  Director  Address  Valid Date Range    [address] [facility]         CPAP +5, PSV %, FIO2 40%, Sigrid

## 2020-12-11 NOTE — PROGRESS NOTES
12/11/2020  4:50 AM - Slick Wolf Incoming Lab Results From [employer]  Value  Ref Range & Units  Status  Collected  Lab    pH, Arterial  7.400  7.350 - 7.450  Final  12/11/2020  4:47 AM  [facility] Lab    pCO2, Arterial  41.0  35.0 - 45.0 mmHg  Final  12/11/2020  4:47 AM  [facility] Lab    pO2, Arterial  61. 0Low    80.0 - 100.0 mmHg  Final  12/11/2020  4:47 AM  [facility] Lab    HCO3, Arterial  25.4  22.0 - 26.0 mmol/L  Final  12/11/2020  4:47 AM  [facility] Excess, Arterial  0.5  -2.0 - 2.0 mmol/L  Final  12/11/2020  4:47 AM  [facility] Lab    Hemoglobin, Art, Extended  10.8Low    14.0 - 18.0 g/dL  Final  12/11/2020  4:47 AM  [facility] Lab    O2 Sat, Arterial  93.7  >92 %  Final  12/11/2020  4:47 AM  [facility] Lab    Carboxyhgb, Arterial  2.4  0.0 - 5.0 %  Final  12/11/2020  4:47 AM  [facility] Lab         0.0-1.5   (Smokers 1.5-5.0)    Methemoglobin, Arterial  1.1  <1.5 %  Final  12/11/2020  4:47 AM  [facility] Lab    O2 Content, Arterial  14.3  Not Established mL/dL  Final  12/11/2020  4:47 AM  [address] Lab    O2 Therapy  Unknown   Final  12/11/2020  4:47 AM  [facility] Performed By     Heron Yo  Name  Director  Address  Valid Date Range    [address] [facility] [address]         Nasal cannula @ 4lpm, arterial line

## 2020-12-11 NOTE — CARE COORDINATION
Date / Time of Evaluation: 12/11/2020 3:04 PM  Assessment Completed by: David Zaidi    Patient Admission Status: Inpatient [101]      Current PCP:  Lilia Bullock MD  PCP verified? yes    Initial Assessment Completed at bedside with:  Pt (very "[employer] SW called ex-wife Shoshana Gu; they still live together    Emergency Contacts:  Extended Emergency Contact Information  Primary Emergency Contact: Dayo Richard  Mobile Phone: [phone]  Relation: Other    Advance Directives: Code Status:  Full Code    Financial:  Payor: [employer] / Plan: [address] [place] [address] / Product Type: *No Product type* /     Pre-Cert required for SNF:  yes    Have [address] [place] [address] Avenue:  no    Pharmacy:   Carolee Mcclure 24 [phone] [place] [phone]  [address] [facility] [place] [place] [address]  Phone: [phone] Fax: [phone]      Potential assistance purchasing medications?   no    ADLS:  Support System:   SO/ex-wife    Current Home Environment:  Home with ex-wife  Steps:  no    Plans to RETURN to current housing: yes  Barriers to RETURNING to current housing:  none    Currently ACTIVE with Home Health CARE:  no  [address] [place] [address] Street:  no    DME Provider:  none    Had HOME OXYGEN prior to admission:  No (on oxygen in the room)    Active with HD/PD prior to admission: no  Nephrologist:  no  HD Center:  no    Transition Plan:   home with ex-wife    Transportation PLAN for Discharge:  Family car    Factors facilitating achievement of predicted outcomes: support of family    Barriers to discharge:  none      Additional CM/SW Notes: Pt very "[employer] tried to discuss d/c planning; called ex-wife as Pt pointed to his dry erase board with her name/number on it; she stated they live together and he was working as a  until [place]; re: heart issues; no HH no DME; stated he was self-sufficient; if he does any rehab-cardiac or outpt therapy; they would like to do it closer to home; they [place] in [place];           Catalina Steward and/or his family were provided with choice of provider.         [address] [place] [address] Management Department  Ph:  [phone]   Fax: [phone]  Electronically signed by RACHID Marcus on 12/11/2020 at 3:07 PM

## 2020-12-11 NOTE — PROGRESS NOTES
Spoke with Dr. Markus Dixon about ABGs and he okayed extubation. Pt extubated at 2058. Pt placed on 4L NC. Will continue to monitor.

## 2020-12-11 NOTE — PROGRESS NOTES
ALBERTO received notification from [address] [place] [address]   that they just reached out to her for her to fax clinical information  Information was sent  Will plan for discharge to [facility] tomorrow if Isa Saxena is received  POST OP CARDIOTHORACIC SURGERY PROGRESS NOTE    Post op day 1    SUBJECTIVE:  Awake and alert. Extubated last evening. Up in chair. Comfortable. BP (!) 94/55   Pulse 96   Temp 97.8 °F (36.6 °C) (Temporal)   Resp 18   Ht 5' 11\" (1.803 m)   Wt 249 lb 8 oz (113.2 kg)   SpO2 97%   BMI 34.80 kg/m²   Average, Min, and Max for last 24 hours Vitals:  TEMPERATURE:  Temp  Av °F (36.7 °C)  Min: 93.6 °F (34.2 °C)  Max: 99 °F (37.2 °C)  RESPIRATIONS RANGE: Resp  Av.2  Min: 1  Max: 27  PULSE RANGE: Pulse  Av.3  Min: 79  Max: 105  BLOOD PRESSURE RANGE:  Systolic (72PIA), CGH:882 , Min:94 , UCJ:508   ; Diastolic (38MGT), SXI:61, Min:55, Max:78    PULSE OXIMETRY RANGE: SpO2  Av.7 %  Min: 77 %  Max: 99 %    I/O last 3 completed shifts: In: 6344.3 [I.V.:4719.3; Blood:450; IV [id number]:4027]  Out: 8521 [Urine:2540; Chest Tube:430]    CHEST: coarse breath sounds. CARDIOVASCULAR: regular rhythm, no murmurs    INCISION: no drainage, skin edges intact    DRAINS: output - 430 ml/24 hours; no air leak    LABS:  CBC:   Lab Results   Component Value Date    WBC 9.4 2020    RBC 3.64 2020    HGB 10.7 2020    HCT 34.0 2020    MCV 93.4 2020    MCH 29.4 2020    MCHC 31.5 2020    RDW 13.2 2020    PLT 85 2020    MPV 11.7 2020     BMP:    Lab Results   Component Value Date     2020    K 4.0 2020     2020    CO2 24 2020    BUN 9 2020    LABALBU 4.1 2020    CREATININE 0.5 2020    CALCIUM 7.6 2020    GFRAA >59 2020    LABGLOM >60 2020    GLUCOSE 150 2020       CHEST XRAY: Early ARDS pattern of pulmonary edema. ASSESSMENT: Possible early ARDS. Hemodynamically stable. PLAN: Add diuretic. Hold B-Blocker for now until BP is better.  Keep in ICU  for now    Electronically signed by Xiao Calles MD on 20 at 7:20 AM CST

## 2020-12-11 NOTE — PLAN OF CARE
Problem: Falls - Risk of:  Goal: Will remain free from falls  Description: Will remain free from falls  12/11/2020 0320 by Mekhi León RN  Outcome: Ongoing  12/10/2020 1447 by Cristo Moran RN  Outcome: Ongoing  Goal: Absence of physical injury  Description: Absence of physical injury  12/11/2020 0320 by Mekhi León RN  Outcome: Ongoing  12/10/2020 1447 by Cristo Moran RN  Outcome: Ongoing     Problem: Discharge Planning:  Goal: Discharged to appropriate level of care  Description: Discharged to appropriate level of care  Outcome: Ongoing     Problem: Cardiac Output - Decreased:  Goal: Hemodynamic stability will improve  Description: Hemodynamic stability will improve  12/11/2020 0320 by Mekhi León RN  Outcome: Ongoing  12/10/2020 1447 by Cristo Moran RN  Outcome: Ongoing  Goal: Cardiac output within specified parameters  Description: Cardiac output within specified parameters  Outcome: Ongoing     Problem: Fluid Volume - Imbalance:  Goal: Absence of imbalanced fluid volume signs and symptoms  Description: Absence of imbalanced fluid volume signs and symptoms  12/11/2020 0320 by Mekhi León RN  Outcome: Ongoing  12/10/2020 1447 by Cristo Moran RN  Outcome: Ongoing  Goal: Ability to achieve a balanced intake and output will improve  Description: Ability to achieve a balanced intake and output will improve  Outcome: Ongoing  Goal: Chest tube drainage is within specified parameters  Description: Chest tube drainage is within specified parameters  Outcome: Ongoing     Problem: Gas Exchange - Impaired:  Goal: Levels of oxygenation will improve  Description: Levels of oxygenation will improve  12/11/2020 0320 by Mekhi León RN  Outcome: Ongoing  12/10/2020 1447 by Cristo Moran RN  Outcome: Ongoing  Goal: Ability to maintain adequate ventilation will improve  Description: Ability to maintain adequate ventilation will improve  Outcome: Ongoing     Problem: Pain:  Goal: Pain level will RN  Outcome: Ongoing  12/10/2020 1447 by Jaz Dennison RN  Outcome: Ongoing     Problem: Tissue Perfusion - Cardiopulmonary, Altered:  Goal: Absence of angina  Description: Absence of angina  12/11/2020 0320 by Suellyn Gosselin, RN  Outcome: Ongoing  12/10/2020 1447 by Jaz Dennison RN  Outcome: Ongoing  Goal: Hemodynamic stability will improve  Description: Hemodynamic stability will improve  12/11/2020 0320 by Suellyn Gosselin, RN  Outcome: Ongoing  12/10/2020 1447 by Jaz Dennison RN  Outcome: Ongoing  Goal: Will show no evidence of cardiac arrhythmias  Description: Will show no evidence of cardiac arrhythmias  Outcome: Ongoing     Problem:  Activity Intolerance:  Goal: Able to perform prescribed physical activity  Description: Able to perform prescribed physical activity  Outcome: Ongoing  Goal: Ability to tolerate increased activity will improve  Description: Ability to tolerate increased activity will improve  Outcome: Ongoing     Problem: Anxiety:  Goal: Level of anxiety will decrease  Description: Level of anxiety will decrease  Outcome: Ongoing     Problem: Tobacco Use:  Goal: Will participate in inpatient tobacco-use cessation counseling  Description: Will participate in inpatient tobacco-use cessation counseling  Outcome: Ongoing

## 2020-12-11 NOTE — OP NOTE
[address]                 Amber [address], [address] [facility]                                OPERATIVE REPORT    PATIENT NAME: Mickey Duarte                       :        1960  MED REC NO:   [id number]                              ROOM:       [facility]  ACCOUNT NO:   [de-identified]                           ADMIT DATE: 12/10/2020  PROVIDER:     Nancie Camejo MD    DATE OF PROCEDURE:  12/10/2020    PREOPERATIVE DIAGNOSES:  1. Severe ischemic dilated cardiomyopathy. 2.  Severe multivessel coronary artery disease. 3.  Severe left ventricular dysfunction with ejection fraction of 20%. 4.  Severe diffuse advanced coronary atherosclerosis. POSTOPERATIVE DIAGNOSES:  1. Severe ischemic dilated cardiomyopathy. 2.  Severe multivessel coronary artery disease. 3.  Severe left ventricular dysfunction with ejection fraction of 20%. 4.  Severe diffuse advanced coronary atherosclerosis. 5.  Severe atherosclerosis of the ascending thoracic aorta. 6.  Severe cardiomegaly with dilated left ventricle. PROCEDURES:  1. Three-vessel perfusion-assisted coronary artery bypass grafting  placing the left internal mammary artery to the left anterior descending  artery with a  saphenous vein bypass graft to the obtuse marginal branch  And a saphenous vein graft from the OM vein graft to the  posterior descending artery. 2.  Endoscopic saphenous vein harvest, right leg. 3.  Intraoperative transesophageal echocardiography. SURGEON:  Nancie Cameoj MD    ASSISTANT:  Kelly Wynn CFA    ANESTHESIA:  General.    HISTORY AND FINDINGS:  The patient is a 23-year-old obese, type 2  diabetic, who presented to his nurse practitioner in [place], [place]  for routine followup and was found to be significantly tachycardic. Because of this, he was referred to a cardiologist for evaluation.   A 2D  echo demonstrated evidence of a dilated ischemic cardiomyopathy with  very low ejection fraction of only 20% to 30%. The patient underwent  left heart catheterization. This revealed left ventricle that shows a  large area of anterior apical wall and inferior wall akinesis with a  decreased ejection fraction of only 20%. The left main artery has some  distal disease, but not significant. The LAD is totally occluded  proximally, only small segments filled the left to left collaterals. There appears to be a moderate-sized diagonal branch and appears to have  some degree of disease. The circumflex system is a balanced system. There was a proximal complex 80% stenosis that involves a first obtuse  marginal branch. The RCA is relatively small. It was totally occluded  near its origin. The PDA and distal RCA fill by right to right  collaterals. At the time of operation, the patient was extraordinarily  difficult to operate on, he was quite large. The mammary artery was  good size. He had poor saphenous vein retrieved from the patient's  right leg, much of this was not usable. The heart was a very large  fatty heart. It was markedly dilated. There was also significant  cardiomegaly. All of  the coronary vessels were heavily diseased and  difficult to graft. The obtuse marginal branch is a 2 mm vessel and has  severe proximal mid disease. The LAD was found. It was accessible at  its midpoint, where it was heavily atherosclerotic, but did have a lumen  that measured about 1.5 to 2 mm in size. The diagonal branch was  riddled with disease, not graftable. The RCA was heavily diseased  proximally. It was accessible beyond the acute margin where the PDA  originated from. It was about a 1.5 mm vessel in this area. This  patient was also found to have a severely atherosclerotic ascending  thoracic aorta, which made placement of the proximal vein graft  anastomoses very difficult and challenging.   I had to use the  Heartstring device to safely place the proximal anastomoses, but a very  thick atherosclerotic aorta. Overall, this patient was very difficult  to operate on. He was very high risk. He had a severely dilated  ischemic cardiomyopathy, severe cardiomegaly that made it difficult to  place the cardiac stabilizer. His aorta is treacherous. He is not a candidate for further cardiac  surgery. DESCRIPTION OF OPERATION:  The patient was taken to the operating room  and placed in the supine position. General anesthesia was then  administered. Transesophageal echo was then carried out demonstrating a  very dilated left ventricle, the septum was akinetic, the anterior  apical wall was akinetic, much of the inferior wall was akinetic,  ejection fraction was about 20% with mild mitral valve regurgitation. The patient was then prepped and draped in the sterile fashion for  cardiac surgery. Saphenous vein was then taken endoscopically from the  patient's right leg taking the vein just below the right knee all the  way to the groin. Some of this vein was small, not useable. The  remainder of the vein was usable, but was rather poor quality. The  chest was then entered through a median sternotomy incision. The left  internal mammary artery was then dissected off the left chest wall as a  pedicle. The patient was then systemically heparinized and was  cannulated in the usual fashion. The mammary artery was then retrieved  from the left chest.  Fortunately, this was a good conduit with good  length. The distal lumen measured about 2 mm in size with excellent  flows. The patient was then placed on cardiopulmonary bypass and  maintained at 37-degree centigrade using the perfusion-assisted beating  heart technique. I then used a cardiac stabilizer to help dissect out  the distal vessels. Once again because of the cardiomegaly present, it  was very difficult for the cardiac stabilizer to maintain positioning. I approached the PDA first coming off the acute margin.   A proximal  vessel loop was not used as this vessel was chronically occluded. A  cardiac stabilizer was then applied. The acute margin was retracted. The vessel was opened sharply. I was able to place a 1.5 mm shunt and a  segment of saphenous vein was then anastomosed end-to-side to the PDA  with a running 7-0 Prolene suture. The anastomosis was very difficult  to perform as the vessel was so small. I then approached the obtuse  marginal branch. This was the most difficult vessel to get to as the  patient's cardiomegaly is significant and trying to position the cardiac  stabilizer was nearly impossible, in fact I had to trade out for a new  cardiac stabilizer as the one I was using was worn out. Once I was able  to finally obtain adequate positioning, a proximal vessel loop was then  placed. The cardiac stabilizer was stabilized. The vessel was opened  sharply. I was able to insert a 2 mm shunt and a segment of saphenous  vein was then anastomosed end-to-side to the obtuse marginal branch with  a running 7-0 Prolene suture. I then approached the LAD. The left  internal mammary artery was then brought through a pericardial tunnel  and prepared for distal anastomosis. A vessel loop was not used as the  vessel was chronically occluded. The vessel was opened sharply. The  cardiac stabilizer was then applied. I was able to place a 1.5 mm shunt  and the left internal mammary artery was then anastomosed end-to-side to  the midpoint of the LAD with a running 7-0 Prolene suture. The  stabilizer was then removed and hemostasis was secured. I then tried  placing a partial occlusion clamp on the ascending aorta. However, once  this was done, I noted that there was very heavy plaque present in the  anterior surface of the ascending aorta such that placement of the  proximal anastomosis was going to be impossible using the usual  approach. Therefore, the clamp was removed.   Unfortunately, the vein  graft going to the PDA was quite short. It would not reach all the way  up to the vein graft going to the obtuse marginal branch and therefore,  I was forced to try to create an anastomosis using the Heartstring  device on the more proximal portion of the ascending aorta. However,  even with this maneuver, the aorta was too thick. The anastomosis was  terrible. The vein was quite small and it was impossible to complete  this anastomosis. Therefore, the anastomosis was taken down, partial  clamp was then placed. The arteriotomy site was closed with multiple  pledgeted 4-0 Prolene sutures. Having no other choice, but to lengthen  the vein graft, I went down to the patient's right leg, harvested a  segment of vein from the ankle through midcalf area before this vein  became unusable. Fortunately, it was enough to lengthen and extend the  vein graft from the acute margin all the way up to the aorta where I  could anastomose it end-to-side to the obtuse marginal branch vein  graft. Therefore, I used another Heartstring device, created an  arteriotomy in the lesser curvature of the aorta in a relatively soft  spot; although, it was still quite thick. The obtuse marginal branch  vein graft was then sutured end-to-side to the aorta, but I had to use a  5-0 Prolene suture because the usual 6-0 needle was going to be too  small to get through this very thick aorta. Once the anastomosis was  performed, the Heartstring was removed and hemostasis was complete. I  then created a vein-to-vein anastomosis suturing the PDA vein graft  end-to-side to the obtuse marginal branch vein graft with a running 7-0  Prolene suture. After termination of all of these anastomoses, I then  checked for hemostasis. There was some bleeding coming from the obtuse  marginal branch _____, and this was repaired with multiple 7-0 Prolene  sutures. Now that hemostasis was complete, the heart looked fairly  good.   I then placed two right atrial and a single bipolar right  ventricular epicardial pacemaker lead. The patient maintained a sinus  rhythm in the 70s. Ventilations then resumed. Under echo guidance, the  patient was then weaned from cardiopulmonary bypass, which he tolerated  fairly well coming off with a systemic pressure of 100 to 110, a cardiac  output of 6 liters per minute, and pulmonary artery pressures of 38/14. Protamine was then administered and the cannulas were then removed. As  the patient remained relatively stable, the pericardium was left open. Two #19 Marshall drains were placed, one anteriorly along the diaphragmatic  surface. The chest was then reapproximated with six double twisted  stainless steel wires. The fascia was then closed with a running 0 PDS  suture. The subcutaneous tissue and skin were closed with 2-0 and 4-0  Vicryl with a Prineo dressing. Sponge and needle counts were correct. There were no apparent complications during the operation. Total  cardiopulmonary bypass time was 172 minutes. The cross clamp was never  used. The patient was then taken to the Intensive Care Unit in serious  Condition. Blood loss is unknown          Luis Miranda MD    D: 12/10/2020 14:02:47      T: 12/10/2020 15:09:20     [email or web address]  Job#: [id number]     Doc#: [id number]    CC:

## 2020-12-12 ENCOUNTER — APPOINTMENT (OUTPATIENT)
Dept: GENERAL RADIOLOGY | Age: 60
DRG: 236 | End: 2020-12-12
Attending: THORACIC SURGERY (CARDIOTHORACIC VASCULAR SURGERY)
Payer: COMMERCIAL

## 2020-12-12 LAB
ANION GAP SERPL CALCULATED.3IONS-SCNC: 11 MMOL/L (ref 7–19)
BUN BLDV-MCNC: 8 MG/DL (ref 8–23)
CALCIUM SERPL-MCNC: 7.7 MG/DL (ref 8.8–10.2)
CHLORIDE BLD-SCNC: 103 MMOL/L (ref 98–111)
CO2: 26 MMOL/L (ref 22–29)
CREAT SERPL-MCNC: 0.6 MG/DL (ref 0.5–1.2)
GFR AFRICAN AMERICAN: >59
GFR NON-AFRICAN AMERICAN: >60
GLUCOSE BLD-MCNC: 107 MG/DL (ref 74–109)
GLUCOSE BLD-MCNC: 133 MG/DL (ref 70–99)
GLUCOSE BLD-MCNC: 146 MG/DL (ref 70–99)
GLUCOSE BLD-MCNC: 152 MG/DL (ref 70–99)
GLUCOSE BLD-MCNC: 152 MG/DL (ref 70–99)
HCT VFR BLD CALC: 30.2 % (ref 42–52)
HEMOGLOBIN: 9.6 G/DL (ref 14–18)
MCH RBC QN AUTO: 29.9 PG (ref 27–31)
MCHC RBC AUTO-ENTMCNC: 31.8 G/DL (ref 33–37)
MCV RBC AUTO: 94.1 FL (ref 80–94)
PDW BLD-RTO: 13.1 % (ref 11.5–14.5)
PERFORMED ON: ABNORMAL
PLATELET # BLD: 73 K/UL (ref 130–400)
PMV BLD AUTO: 12.1 FL (ref 9.4–12.4)
POTASSIUM SERPL-SCNC: 3.5 MMOL/L (ref 3.5–5)
RBC # BLD: 3.21 M/UL (ref 4.7–6.1)
SODIUM BLD-SCNC: 140 MMOL/L (ref 136–145)
WBC # BLD: 9.6 K/UL (ref 4.8–10.8)

## 2020-12-12 PROCEDURE — 94640 AIRWAY INHALATION TREATMENT: CPT

## 2020-12-12 PROCEDURE — 6370000000 HC RX 637 (ALT 250 FOR IP): Performed by: THORACIC SURGERY (CARDIOTHORACIC VASCULAR SURGERY)

## 2020-12-12 PROCEDURE — 2580000003 HC RX 258: Performed by: THORACIC SURGERY (CARDIOTHORACIC VASCULAR SURGERY)

## 2020-12-12 PROCEDURE — 6360000002 HC RX W HCPCS: Performed by: THORACIC SURGERY (CARDIOTHORACIC VASCULAR SURGERY)

## 2020-12-12 PROCEDURE — 97161 PT EVAL LOW COMPLEX 20 MIN: CPT

## 2020-12-12 PROCEDURE — 85027 COMPLETE CBC AUTOMATED: CPT

## 2020-12-12 PROCEDURE — 82947 ASSAY GLUCOSE BLOOD QUANT: CPT

## 2020-12-12 PROCEDURE — 71045 X-RAY EXAM CHEST 1 VIEW: CPT

## 2020-12-12 PROCEDURE — 2140000000 HC CCU INTERMEDIATE R&B

## 2020-12-12 PROCEDURE — 99024 POSTOP FOLLOW-UP VISIT: CPT | Performed by: THORACIC SURGERY (CARDIOTHORACIC VASCULAR SURGERY)

## 2020-12-12 PROCEDURE — 2500000003 HC RX 250 WO HCPCS: Performed by: THORACIC SURGERY (CARDIOTHORACIC VASCULAR SURGERY)

## 2020-12-12 PROCEDURE — 80048 BASIC METABOLIC PNL TOTAL CA: CPT

## 2020-12-12 PROCEDURE — 97116 GAIT TRAINING THERAPY: CPT

## 2020-12-12 PROCEDURE — 2700000000 HC OXYGEN THERAPY PER DAY

## 2020-12-12 RX ADMIN — INSULIN GLARGINE 16 UNITS: 100 INJECTION, SOLUTION SUBCUTANEOUS at 22:00

## 2020-12-12 RX ADMIN — IPRATROPIUM BROMIDE AND ALBUTEROL SULFATE 1 AMPULE: .5; 3 SOLUTION RESPIRATORY (INHALATION) at 19:28

## 2020-12-12 RX ADMIN — BUMETANIDE 1 MG: 0.25 INJECTION, SOLUTION INTRAMUSCULAR; INTRAVENOUS at 20:00

## 2020-12-12 RX ADMIN — INSULIN LISPRO 1 UNITS: 100 INJECTION, SOLUTION INTRAVENOUS; SUBCUTANEOUS at 21:24

## 2020-12-12 RX ADMIN — SODIUM CHLORIDE, PRESERVATIVE FREE 10 ML: 5 INJECTION INTRAVENOUS at 08:23

## 2020-12-12 RX ADMIN — IPRATROPIUM BROMIDE AND ALBUTEROL SULFATE 1 AMPULE: .5; 3 SOLUTION RESPIRATORY (INHALATION) at 14:55

## 2020-12-12 RX ADMIN — ASPIRIN 81 MG: 81 TABLET, COATED ORAL at 08:23

## 2020-12-12 RX ADMIN — IPRATROPIUM BROMIDE AND ALBUTEROL SULFATE 1 AMPULE: .5; 3 SOLUTION RESPIRATORY (INHALATION) at 06:05

## 2020-12-12 RX ADMIN — BUMETANIDE 1 MG: 0.25 INJECTION, SOLUTION INTRAMUSCULAR; INTRAVENOUS at 08:23

## 2020-12-12 RX ADMIN — MORPHINE SULFATE 2 MG: 4 INJECTION, SOLUTION INTRAMUSCULAR; INTRAVENOUS at 11:51

## 2020-12-12 RX ADMIN — SODIUM CHLORIDE, PRESERVATIVE FREE 10 ML: 5 INJECTION INTRAVENOUS at 21:08

## 2020-12-12 RX ADMIN — SACUBITRIL AND VALSARTAN 1 TABLET: 24; 26 TABLET, FILM COATED ORAL at 21:07

## 2020-12-12 RX ADMIN — MORPHINE SULFATE 2 MG: 4 INJECTION, SOLUTION INTRAMUSCULAR; INTRAVENOUS at 09:31

## 2020-12-12 RX ADMIN — CLOPIDOGREL BISULFATE 75 MG: 75 TABLET ORAL at 08:23

## 2020-12-12 RX ADMIN — SACUBITRIL AND VALSARTAN 1 TABLET: 24; 26 TABLET, FILM COATED ORAL at 08:23

## 2020-12-12 RX ADMIN — OXYCODONE HYDROCHLORIDE 10 MG: 5 TABLET ORAL at 05:57

## 2020-12-12 RX ADMIN — Medication 2 G: at 03:42

## 2020-12-12 RX ADMIN — OXYCODONE HYDROCHLORIDE 10 MG: 5 TABLET ORAL at 01:45

## 2020-12-12 RX ADMIN — MORPHINE SULFATE 2 MG: 4 INJECTION, SOLUTION INTRAMUSCULAR; INTRAVENOUS at 02:47

## 2020-12-12 RX ADMIN — ATORVASTATIN CALCIUM 20 MG: 20 TABLET, FILM COATED ORAL at 21:08

## 2020-12-12 RX ADMIN — MORPHINE SULFATE 4 MG: 4 INJECTION, SOLUTION INTRAMUSCULAR; INTRAVENOUS at 23:33

## 2020-12-12 RX ADMIN — OXYCODONE HYDROCHLORIDE 10 MG: 5 TABLET ORAL at 10:59

## 2020-12-12 RX ADMIN — OXYCODONE HYDROCHLORIDE 10 MG: 5 TABLET ORAL at 16:31

## 2020-12-12 RX ADMIN — MORPHINE SULFATE 4 MG: 4 INJECTION, SOLUTION INTRAMUSCULAR; INTRAVENOUS at 15:38

## 2020-12-12 RX ADMIN — OXYCODONE HYDROCHLORIDE 10 MG: 5 TABLET ORAL at 21:07

## 2020-12-12 ASSESSMENT — PAIN SCALES - GENERAL
PAINLEVEL_OUTOF10: 10
PAINLEVEL_OUTOF10: 9
PAINLEVEL_OUTOF10: 9
PAINLEVEL_OUTOF10: 10
PAINLEVEL_OUTOF10: 8
PAINLEVEL_OUTOF10: 8
PAINLEVEL_OUTOF10: 9
PAINLEVEL_OUTOF10: 6
PAINLEVEL_OUTOF10: 2
PAINLEVEL_OUTOF10: 8
PAINLEVEL_OUTOF10: 9
PAINLEVEL_OUTOF10: 9
PAINLEVEL_OUTOF10: 7
PAINLEVEL_OUTOF10: 7
PAINLEVEL_OUTOF10: 3
PAINLEVEL_OUTOF10: 6
PAINLEVEL_OUTOF10: 9
PAINLEVEL_OUTOF10: 8
PAINLEVEL_OUTOF10: 7
PAINLEVEL_OUTOF10: 6
PAINLEVEL_OUTOF10: 9
PAINLEVEL_OUTOF10: 5

## 2020-12-12 ASSESSMENT — PAIN DESCRIPTION - PAIN TYPE
TYPE: SURGICAL PAIN

## 2020-12-12 ASSESSMENT — PAIN DESCRIPTION - LOCATION
LOCATION: STERNUM
LOCATION: SACRUM
LOCATION: STERNUM

## 2020-12-12 ASSESSMENT — PAIN DESCRIPTION - ORIENTATION
ORIENTATION: MID

## 2020-12-12 ASSESSMENT — PAIN DESCRIPTION - ONSET: ONSET: ON-GOING

## 2020-12-12 NOTE — PROGRESS NOTES
CVTS Staff POD#2    No issues overnight  Complaining of incisional pain  AVSS  Labs ok  CXR still with pulmonary edema  No issues on physical exam    Continue current management  Continue current medications  Morphine for pain  OK to transfer to PCU

## 2020-12-12 NOTE — PROGRESS NOTES
PHYSICAL THERAPY  Daily Treatment Note    DATE:  2020  NAME:  Glenn Collins  :  1960  (59 y.o.,male)  MRN:  [id number]      HOME LIVING:       RESTRICTIONS/PRECAUTIONS:         OVERALL  ORIENTATION STATUS:  Overall Orientation Status: Within Normal Limits    STRENGTH  Strength RLE  Strength RLE: WFL  Strength LLE  Strength LLE: WFL    ROM   PROM RLE (degrees)  RLE PROM: WFL  PROM LLE (degrees)  LLE PROM: WFL     BALANCE  Balance  Posture: Good  Sitting - Static: Good  Sitting - Dynamic: Good  Standing - Static: Fair, +  Standing - Dynamic: Fair, +    BED MOBILITY  Bed Mobility  Scooting: Minimal assistance    TRANSFERS  Transfers  Sit to Stand: Stand by assistance  Stand to sit: Stand by assistance  Bed to Chair: Stand by assistance    AMBULATION  Device: No Device  Assistance: Stand by assistance  Distance: 30 feet    STAIRS         COMMENTS:  Returned to chair at patient request  Call light within reach  Patient instructed to request assistance before getting up  Nursing updated        Electronically signed by Jasvir Perez PT on 2020 at 8:19 AM

## 2020-12-12 NOTE — PROGRESS NOTES
Physical Therapy    Facility/Department: [facility] ICU  Initial Assessment    NAME: Tova Barraza  : 1960  MRN: [id number]    Date of Service: 2020    Discharge Recommendations:           Assessment   Body structures, Functions, Activity limitations: Decreased functional mobility   Assessment: Patient will benefit from continuing skilled physical therapy to improve mobility  Treatment Diagnosis: Decline in mobility  Prognosis: Good  Decision Making: Low Complexity  REQUIRES PT FOLLOW UP: Yes  Activity Tolerance  Activity Tolerance: Patient Tolerated treatment well       Patient Diagnosis(es): There were no encounter diagnoses. has a past medical history of CAD (coronary artery disease), Diabetes mellitus ([facility] [address] [place] (hard of hearing), Hyperlipidemia, and Hypertension. has a past surgical history that includes Elbow surgery (Right); Finger surgery; Cardiac catheterization (2020); and Coronary artery bypass graft (N/A, 12/10/2020). Restrictions     Vision/Hearing  Hearing: Exceptions to [facility]  Hearing Exceptions: Left hearing aid;Hard of hearing/hearing concerns     Subjective  General  Chart Reviewed: Yes  Diagnosis: CABG  Follows Commands: Within Functional Limits  General Comment  Comments: Very [place]  Subjective  Subjective: Agrees to work with therapy  Pain Screening  Patient Currently in Pain: Yes          Orientation  Orientation  Overall Orientation Status: Within Normal Limits  Social/Functional History     Cognition        Objective          PROM RLE (degrees)  RLE PROM: WFL  PROM LLE (degrees)  LLE PROM: WFL  Strength RLE  Strength RLE: WFL  Strength LLE  Strength LLE: WFL        Bed mobility  Supine to Sit: Moderate assistance  Sit to Supine:  Moderate assistance  Scooting: Minimal assistance  Transfers  Sit to Stand: Stand by assistance  Stand to sit: Stand by assistance  Bed to Chair: Stand by assistance  Ambulation  Ambulation?: Yes  WB Status: WBAT  Ambulation 1  Device: No Device  Assistance: Stand by assistance  Quality of Gait: Fair  Distance: 30 feet     Balance  Posture: Good  Sitting - Static: Good  Sitting - Dynamic: Good  Standing - Static: Fair;+  Standing - Dynamic: Fair;+        Plan   Plan  Times per week: 7  Times per day: Daily  Plan weeks: 2  Current Treatment Recommendations: Strengthening, Functional Mobility Training, Transfer Training, Gait Training  Safety Devices  Type of devices: Call light within reach, Left in chair, Nurse notified    G-Code       OutComes Score                                                  AM-PAC Score             Goals  Short term goals  Time Frame for Short term goals: 2 weeks  Short term goal 1: Independent with bed mobility and transfers  Short term goal 2: Ambulate 400 feet independently       Therapy Time   Individual Concurrent Group Co-treatment   Time In           Time Out           Minutes                   Elizabeth Castro PT       Electronically signed by Elizabeth Castro PT on 12/12/2020 at 8:18 AM

## 2020-12-13 ENCOUNTER — APPOINTMENT (OUTPATIENT)
Dept: GENERAL RADIOLOGY | Age: 60
DRG: 236 | End: 2020-12-13
Attending: THORACIC SURGERY (CARDIOTHORACIC VASCULAR SURGERY)
Payer: COMMERCIAL

## 2020-12-13 LAB
ANION GAP SERPL CALCULATED.3IONS-SCNC: 12 MMOL/L (ref 7–19)
BUN BLDV-MCNC: 10 MG/DL (ref 8–23)
CALCIUM SERPL-MCNC: 7.9 MG/DL (ref 8.8–10.2)
CHLORIDE BLD-SCNC: 100 MMOL/L (ref 98–111)
CO2: 29 MMOL/L (ref 22–29)
CREAT SERPL-MCNC: 0.6 MG/DL (ref 0.5–1.2)
GFR AFRICAN AMERICAN: >59
GFR NON-AFRICAN AMERICAN: >60
GLUCOSE BLD-MCNC: 105 MG/DL (ref 74–109)
GLUCOSE BLD-MCNC: 111 MG/DL (ref 70–99)
GLUCOSE BLD-MCNC: 121 MG/DL (ref 70–99)
GLUCOSE BLD-MCNC: 124 MG/DL (ref 70–99)
GLUCOSE BLD-MCNC: 162 MG/DL (ref 70–99)
GLUCOSE BLD-MCNC: 95 MG/DL (ref 70–99)
HCT VFR BLD CALC: 29.9 % (ref 42–52)
HEMOGLOBIN: 9.5 G/DL (ref 14–18)
MCH RBC QN AUTO: 29.9 PG (ref 27–31)
MCHC RBC AUTO-ENTMCNC: 31.8 G/DL (ref 33–37)
MCV RBC AUTO: 94 FL (ref 80–94)
PDW BLD-RTO: 13.1 % (ref 11.5–14.5)
PERFORMED ON: ABNORMAL
PERFORMED ON: NORMAL
PLATELET # BLD: 93 K/UL (ref 130–400)
PMV BLD AUTO: 11.9 FL (ref 9.4–12.4)
POTASSIUM SERPL-SCNC: 3.1 MMOL/L (ref 3.5–5)
RBC # BLD: 3.18 M/UL (ref 4.7–6.1)
SODIUM BLD-SCNC: 141 MMOL/L (ref 136–145)
WBC # BLD: 9.4 K/UL (ref 4.8–10.8)

## 2020-12-13 PROCEDURE — 2140000000 HC CCU INTERMEDIATE R&B

## 2020-12-13 PROCEDURE — 82947 ASSAY GLUCOSE BLOOD QUANT: CPT

## 2020-12-13 PROCEDURE — 6370000000 HC RX 637 (ALT 250 FOR IP): Performed by: THORACIC SURGERY (CARDIOTHORACIC VASCULAR SURGERY)

## 2020-12-13 PROCEDURE — 94640 AIRWAY INHALATION TREATMENT: CPT

## 2020-12-13 PROCEDURE — 6370000000 HC RX 637 (ALT 250 FOR IP)

## 2020-12-13 PROCEDURE — 99024 POSTOP FOLLOW-UP VISIT: CPT | Performed by: THORACIC SURGERY (CARDIOTHORACIC VASCULAR SURGERY)

## 2020-12-13 PROCEDURE — 2500000003 HC RX 250 WO HCPCS: Performed by: THORACIC SURGERY (CARDIOTHORACIC VASCULAR SURGERY)

## 2020-12-13 PROCEDURE — 6360000002 HC RX W HCPCS: Performed by: THORACIC SURGERY (CARDIOTHORACIC VASCULAR SURGERY)

## 2020-12-13 PROCEDURE — 97530 THERAPEUTIC ACTIVITIES: CPT

## 2020-12-13 PROCEDURE — 94760 N-INVAS EAR/PLS OXIMETRY 1: CPT

## 2020-12-13 PROCEDURE — 2700000000 HC OXYGEN THERAPY PER DAY

## 2020-12-13 PROCEDURE — 80048 BASIC METABOLIC PNL TOTAL CA: CPT

## 2020-12-13 PROCEDURE — 36415 COLL VENOUS BLD VENIPUNCTURE: CPT

## 2020-12-13 PROCEDURE — 71045 X-RAY EXAM CHEST 1 VIEW: CPT

## 2020-12-13 PROCEDURE — 85027 COMPLETE CBC AUTOMATED: CPT

## 2020-12-13 PROCEDURE — 2580000003 HC RX 258: Performed by: THORACIC SURGERY (CARDIOTHORACIC VASCULAR SURGERY)

## 2020-12-13 PROCEDURE — 97116 GAIT TRAINING THERAPY: CPT

## 2020-12-13 RX ORDER — POTASSIUM CHLORIDE 7.45 MG/ML
10 INJECTION INTRAVENOUS PRN
Status: DISCONTINUED | OUTPATIENT
Start: 2020-12-13 | End: 2020-12-14 | Stop reason: HOSPADM

## 2020-12-13 RX ORDER — POLYETHYLENE GLYCOL 3350 17 G/17G
POWDER, FOR SOLUTION ORAL
Status: COMPLETED
Start: 2020-12-13 | End: 2020-12-13

## 2020-12-13 RX ORDER — MIRTAZAPINE 15 MG/1
45 TABLET, FILM COATED ORAL NIGHTLY
Status: DISCONTINUED | OUTPATIENT
Start: 2020-12-13 | End: 2020-12-14 | Stop reason: HOSPADM

## 2020-12-13 RX ORDER — CARVEDILOL 6.25 MG/1
6.25 TABLET ORAL 2 TIMES DAILY WITH MEALS
Status: DISCONTINUED | OUTPATIENT
Start: 2020-12-13 | End: 2020-12-14 | Stop reason: HOSPADM

## 2020-12-13 RX ORDER — KETOROLAC TROMETHAMINE 30 MG/ML
30 INJECTION, SOLUTION INTRAMUSCULAR; INTRAVENOUS EVERY 6 HOURS PRN
Status: DISCONTINUED | OUTPATIENT
Start: 2020-12-13 | End: 2020-12-14 | Stop reason: HOSPADM

## 2020-12-13 RX ORDER — POLYETHYLENE GLYCOL 3350 17 G/17G
17 POWDER, FOR SOLUTION ORAL DAILY
Status: DISCONTINUED | OUTPATIENT
Start: 2020-12-13 | End: 2020-12-14 | Stop reason: HOSPADM

## 2020-12-13 RX ORDER — POTASSIUM CHLORIDE 20 MEQ/1
40 TABLET, EXTENDED RELEASE ORAL PRN
Status: DISCONTINUED | OUTPATIENT
Start: 2020-12-13 | End: 2020-12-14 | Stop reason: HOSPADM

## 2020-12-13 RX ADMIN — SODIUM CHLORIDE, PRESERVATIVE FREE 10 ML: 5 INJECTION INTRAVENOUS at 03:27

## 2020-12-13 RX ADMIN — IPRATROPIUM BROMIDE AND ALBUTEROL SULFATE 1 AMPULE: .5; 3 SOLUTION RESPIRATORY (INHALATION) at 14:34

## 2020-12-13 RX ADMIN — INSULIN GLARGINE 16 UNITS: 100 INJECTION, SOLUTION SUBCUTANEOUS at 20:37

## 2020-12-13 RX ADMIN — POTASSIUM CHLORIDE 40 MEQ: 1500 TABLET, EXTENDED RELEASE ORAL at 09:25

## 2020-12-13 RX ADMIN — OXYCODONE HYDROCHLORIDE 10 MG: 5 TABLET ORAL at 12:36

## 2020-12-13 RX ADMIN — MAGNESIUM HYDROXIDE 30 ML: 400 SUSPENSION ORAL at 05:41

## 2020-12-13 RX ADMIN — SODIUM CHLORIDE, PRESERVATIVE FREE 10 ML: 5 INJECTION INTRAVENOUS at 07:43

## 2020-12-13 RX ADMIN — METFORMIN HYDROCHLORIDE 500 MG: 500 TABLET ORAL at 17:15

## 2020-12-13 RX ADMIN — SACUBITRIL AND VALSARTAN 1 TABLET: 24; 26 TABLET, FILM COATED ORAL at 20:37

## 2020-12-13 RX ADMIN — IPRATROPIUM BROMIDE AND ALBUTEROL SULFATE 1 AMPULE: .5; 3 SOLUTION RESPIRATORY (INHALATION) at 18:53

## 2020-12-13 RX ADMIN — IPRATROPIUM BROMIDE AND ALBUTEROL SULFATE 1 AMPULE: .5; 3 SOLUTION RESPIRATORY (INHALATION) at 06:49

## 2020-12-13 RX ADMIN — CLOPIDOGREL BISULFATE 75 MG: 75 TABLET ORAL at 07:41

## 2020-12-13 RX ADMIN — ATORVASTATIN CALCIUM 20 MG: 20 TABLET, FILM COATED ORAL at 20:37

## 2020-12-13 RX ADMIN — IPRATROPIUM BROMIDE AND ALBUTEROL SULFATE 1 AMPULE: .5; 3 SOLUTION RESPIRATORY (INHALATION) at 10:34

## 2020-12-13 RX ADMIN — POLYETHYLENE GLYCOL 3350 17 G: 17 POWDER, FOR SOLUTION ORAL at 07:42

## 2020-12-13 RX ADMIN — KETOROLAC TROMETHAMINE 30 MG: 30 INJECTION, SOLUTION INTRAMUSCULAR; INTRAVENOUS at 14:57

## 2020-12-13 RX ADMIN — ASPIRIN 81 MG: 81 TABLET, COATED ORAL at 07:41

## 2020-12-13 RX ADMIN — SODIUM CHLORIDE 15 ML/HR: 9 INJECTION, SOLUTION INTRAVENOUS at 08:25

## 2020-12-13 RX ADMIN — SACUBITRIL AND VALSARTAN 1 TABLET: 24; 26 TABLET, FILM COATED ORAL at 07:41

## 2020-12-13 RX ADMIN — OXYCODONE HYDROCHLORIDE 10 MG: 5 TABLET ORAL at 07:45

## 2020-12-13 RX ADMIN — MORPHINE SULFATE 4 MG: 4 INJECTION, SOLUTION INTRAMUSCULAR; INTRAVENOUS at 03:26

## 2020-12-13 RX ADMIN — MIRTAZAPINE 45 MG: 15 TABLET, FILM COATED ORAL at 20:37

## 2020-12-13 RX ADMIN — BUMETANIDE 1 MG: 0.25 INJECTION, SOLUTION INTRAMUSCULAR; INTRAVENOUS at 06:00

## 2020-12-13 RX ADMIN — OXYCODONE HYDROCHLORIDE 5 MG: 5 TABLET ORAL at 18:23

## 2020-12-13 RX ADMIN — MORPHINE SULFATE 4 MG: 4 INJECTION, SOLUTION INTRAMUSCULAR; INTRAVENOUS at 05:41

## 2020-12-13 RX ADMIN — CARVEDILOL 6.25 MG: 6.25 TABLET, FILM COATED ORAL at 17:15

## 2020-12-13 RX ADMIN — OXYCODONE HYDROCHLORIDE 10 MG: 5 TABLET ORAL at 01:59

## 2020-12-13 ASSESSMENT — PAIN SCALES - GENERAL
PAINLEVEL_OUTOF10: 0
PAINLEVEL_OUTOF10: 7
PAINLEVEL_OUTOF10: 4
PAINLEVEL_OUTOF10: 6
PAINLEVEL_OUTOF10: 6
PAINLEVEL_OUTOF10: 8
PAINLEVEL_OUTOF10: 3
PAINLEVEL_OUTOF10: 8
PAINLEVEL_OUTOF10: 8
PAINLEVEL_OUTOF10: 0
PAINLEVEL_OUTOF10: 0
PAINLEVEL_OUTOF10: 6

## 2020-12-13 ASSESSMENT — PAIN DESCRIPTION - PAIN TYPE
TYPE: ACUTE PAIN
TYPE: SURGICAL PAIN
TYPE: SURGICAL PAIN
TYPE: ACUTE PAIN;SURGICAL PAIN
TYPE: ACUTE PAIN;SURGICAL PAIN

## 2020-12-13 ASSESSMENT — PAIN DESCRIPTION - LOCATION
LOCATION: STERNUM
LOCATION: BUTTOCKS
LOCATION: BUTTOCKS;STERNUM
LOCATION: BUTTOCKS;STERNUM
LOCATION: STERNUM

## 2020-12-13 ASSESSMENT — PAIN DESCRIPTION - ORIENTATION: ORIENTATION: MID

## 2020-12-13 NOTE — PLAN OF CARE
Problem: Falls - Risk of:  Goal: Will remain free from falls  Description: Will remain free from falls  Outcome: Ongoing  Goal: Absence of physical injury  Description: Absence of physical injury  Outcome: Ongoing     Problem: Discharge Planning:  Goal: Discharged to appropriate level of care  Description: Discharged to appropriate level of care  Outcome: Ongoing     Problem: Cardiac Output - Decreased:  Goal: Hemodynamic stability will improve  Description: Hemodynamic stability will improve  Outcome: Ongoing  Goal: Cardiac output within specified parameters  Description: Cardiac output within specified parameters  Outcome: Ongoing     Problem: Fluid Volume - Imbalance:  Goal: Absence of imbalanced fluid volume signs and symptoms  Description: Absence of imbalanced fluid volume signs and symptoms  Outcome: Ongoing  Goal: Ability to achieve a balanced intake and output will improve  Description: Ability to achieve a balanced intake and output will improve  Outcome: Ongoing  Goal: Chest tube drainage is within specified parameters  Description: Chest tube drainage is within specified parameters  Outcome: Ongoing     Problem: Gas Exchange - Impaired:  Goal: Levels of oxygenation will improve  Description: Levels of oxygenation will improve  Outcome: Ongoing  Goal: Ability to maintain adequate ventilation will improve  Description: Ability to maintain adequate ventilation will improve  Outcome: Ongoing     Problem: Pain:  Goal: Pain level will decrease  Description: Pain level will decrease  Outcome: Ongoing  Goal: Recognizes and communicates pain  Description: Recognizes and communicates pain  Outcome: Ongoing  Goal: Control of acute pain  Description: Control of acute pain  Outcome: Ongoing  Goal: Control of chronic pain  Description: Control of chronic pain  Outcome: Ongoing     Problem: Serum Glucose Level - Abnormal:  Goal: Ability to maintain appropriate glucose levels will improve to within specified pain  Outcome: Ongoing     Problem: Skin Integrity:  Goal: Will show no infection signs and symptoms  Description: Will show no infection signs and symptoms  Outcome: Ongoing  Goal: Absence of new skin breakdown  Description: Absence of new skin breakdown  Outcome: Ongoing

## 2020-12-13 NOTE — PROGRESS NOTES
CVTS Staff POD #3    No issues overnight  Pain still an issue  Tolerating po  Up oob to bathroom without difficulty  AVSS  Labs ok (k replaced)  CXR improved  No issues on physical exam    Continue current management  Add toradol 30 mg IV q 6 hr for 24 hrs  Increase diet and activity level

## 2020-12-13 NOTE — PROGRESS NOTES
K 3.1, notified Dr. Elizabeth Perez, IV-PO potassium replacement orders placed in the Epic with telephone readback. Central line K replacement order discontinued.

## 2020-12-13 NOTE — PROGRESS NOTES
Physical Therapy  Name: Shannan Matthews  MRN:  [id number]  Date of service:  12/13/2020 12/13/20 1034   General   Missed reason Patient declined   Subjective   Subjective Pt refused stating he walked with nursing this morning, his chest is hurting and he thinks he needs to rest now.         Electronically signed by Daryle Flow, PTA on 12/13/2020 at 10:35 AM

## 2020-12-13 NOTE — PROGRESS NOTES
Pt c/o incision pain rate as 9/10 during transfer from ICU to PCU. Pt was asking for pain medication. PRN Morphine 4 mg was given at 1538 per order with [id number]. Reassessed pt's pain level in 40 min, pt states pain level is still around 8-9, requests pain meds again. Oxycondone 10 mg was given at 1631. Reassessed in one hour, pt is comfortably resting in the chair eating supper, nurse assisted pt to the bathroom, pt voided to the urinal w/o difficulty, also tolerated well during ambulation, pt states \"pain is tolerable\". Will continue to monitor.

## 2020-12-14 ENCOUNTER — APPOINTMENT (OUTPATIENT)
Dept: GENERAL RADIOLOGY | Age: 60
DRG: 236 | End: 2020-12-14
Attending: THORACIC SURGERY (CARDIOTHORACIC VASCULAR SURGERY)
Payer: COMMERCIAL

## 2020-12-14 VITALS
HEART RATE: 95 BPM | BODY MASS INDEX: 32.76 KG/M2 | SYSTOLIC BLOOD PRESSURE: 100 MMHG | TEMPERATURE: 97.1 F | HEIGHT: 71 IN | RESPIRATION RATE: 18 BRPM | WEIGHT: 234 LBS | OXYGEN SATURATION: 92 % | DIASTOLIC BLOOD PRESSURE: 63 MMHG

## 2020-12-14 LAB
ALBUMIN SERPL-MCNC: 3.7 G/DL (ref 3.5–5.2)
ALP BLD-CCNC: 57 U/L (ref 40–130)
ALT SERPL-CCNC: 17 U/L (ref 5–41)
ANION GAP SERPL CALCULATED.3IONS-SCNC: 10 MMOL/L (ref 7–19)
AST SERPL-CCNC: 35 U/L (ref 5–40)
BILIRUB SERPL-MCNC: 0.7 MG/DL (ref 0.2–1.2)
BLOOD BANK DISPENSE STATUS: NORMAL
BLOOD BANK DISPENSE STATUS: NORMAL
BLOOD BANK PRODUCT CODE: NORMAL
BLOOD BANK PRODUCT CODE: NORMAL
BPU ID: NORMAL
BPU ID: NORMAL
BUN BLDV-MCNC: 15 MG/DL (ref 8–23)
CALCIUM SERPL-MCNC: 8.3 MG/DL (ref 8.8–10.2)
CHLORIDE BLD-SCNC: 98 MMOL/L (ref 98–111)
CO2: 30 MMOL/L (ref 22–29)
CREAT SERPL-MCNC: 0.6 MG/DL (ref 0.5–1.2)
DESCRIPTION BLOOD BANK: NORMAL
DESCRIPTION BLOOD BANK: NORMAL
GFR AFRICAN AMERICAN: >59
GFR NON-AFRICAN AMERICAN: >60
GLUCOSE BLD-MCNC: 103 MG/DL (ref 74–109)
GLUCOSE BLD-MCNC: 87 MG/DL (ref 70–99)
HCT VFR BLD CALC: 30.6 % (ref 42–52)
HEMOGLOBIN: 9.9 G/DL (ref 14–18)
MCH RBC QN AUTO: 29.9 PG (ref 27–31)
MCHC RBC AUTO-ENTMCNC: 32.4 G/DL (ref 33–37)
MCV RBC AUTO: 92.4 FL (ref 80–94)
PDW BLD-RTO: 12.9 % (ref 11.5–14.5)
PERFORMED ON: NORMAL
PLATELET # BLD: 139 K/UL (ref 130–400)
PMV BLD AUTO: 11.3 FL (ref 9.4–12.4)
POTASSIUM SERPL-SCNC: 3.3 MMOL/L (ref 3.5–5)
RBC # BLD: 3.31 M/UL (ref 4.7–6.1)
SODIUM BLD-SCNC: 138 MMOL/L (ref 136–145)
TOTAL PROTEIN: 5.9 G/DL (ref 6.6–8.7)
WBC # BLD: 7.3 K/UL (ref 4.8–10.8)

## 2020-12-14 PROCEDURE — 6360000002 HC RX W HCPCS: Performed by: THORACIC SURGERY (CARDIOTHORACIC VASCULAR SURGERY)

## 2020-12-14 PROCEDURE — 6370000000 HC RX 637 (ALT 250 FOR IP): Performed by: THORACIC SURGERY (CARDIOTHORACIC VASCULAR SURGERY)

## 2020-12-14 PROCEDURE — 71046 X-RAY EXAM CHEST 2 VIEWS: CPT

## 2020-12-14 PROCEDURE — 94640 AIRWAY INHALATION TREATMENT: CPT

## 2020-12-14 PROCEDURE — 85027 COMPLETE CBC AUTOMATED: CPT

## 2020-12-14 PROCEDURE — 80053 COMPREHEN METABOLIC PANEL: CPT

## 2020-12-14 PROCEDURE — 82947 ASSAY GLUCOSE BLOOD QUANT: CPT

## 2020-12-14 PROCEDURE — 2580000003 HC RX 258: Performed by: THORACIC SURGERY (CARDIOTHORACIC VASCULAR SURGERY)

## 2020-12-14 PROCEDURE — 36415 COLL VENOUS BLD VENIPUNCTURE: CPT

## 2020-12-14 RX ORDER — POTASSIUM CHLORIDE 750 MG/1
10 TABLET, EXTENDED RELEASE ORAL DAILY
Qty: 30 TABLET | Refills: 0 | Status: SHIPPED | OUTPATIENT
Start: 2020-12-14

## 2020-12-14 RX ORDER — CLOPIDOGREL BISULFATE 75 MG/1
75 TABLET ORAL DAILY
Qty: 30 TABLET | Refills: 1 | Status: SHIPPED | OUTPATIENT
Start: 2020-12-14

## 2020-12-14 RX ORDER — CARVEDILOL 6.25 MG/1
6.25 TABLET ORAL 2 TIMES DAILY WITH MEALS
Qty: 60 TABLET | Refills: 1 | Status: SHIPPED | OUTPATIENT
Start: 2020-12-14

## 2020-12-14 RX ORDER — OXYCODONE HYDROCHLORIDE 5 MG/1
5 TABLET ORAL EVERY 6 HOURS PRN
Qty: 50 TABLET | Refills: 0
Start: 2020-12-14 | End: 2020-12-28

## 2020-12-14 RX ORDER — BISACODYL 10 MG
10 SUPPOSITORY, RECTAL RECTAL ONCE
Status: DISCONTINUED | OUTPATIENT
Start: 2020-12-14 | End: 2020-12-14 | Stop reason: HOSPADM

## 2020-12-14 RX ADMIN — POTASSIUM CHLORIDE 40 MEQ: 1500 TABLET, EXTENDED RELEASE ORAL at 08:10

## 2020-12-14 RX ADMIN — POLYETHYLENE GLYCOL 3350 17 G: 17 POWDER, FOR SOLUTION ORAL at 08:10

## 2020-12-14 RX ADMIN — IPRATROPIUM BROMIDE AND ALBUTEROL SULFATE 1 AMPULE: .5; 3 SOLUTION RESPIRATORY (INHALATION) at 06:40

## 2020-12-14 RX ADMIN — MAGNESIUM HYDROXIDE 30 ML: 400 SUSPENSION ORAL at 05:21

## 2020-12-14 RX ADMIN — SACUBITRIL AND VALSARTAN 1 TABLET: 24; 26 TABLET, FILM COATED ORAL at 08:09

## 2020-12-14 RX ADMIN — CARVEDILOL 6.25 MG: 6.25 TABLET, FILM COATED ORAL at 08:09

## 2020-12-14 RX ADMIN — CLOPIDOGREL BISULFATE 75 MG: 75 TABLET ORAL at 08:10

## 2020-12-14 RX ADMIN — KETOROLAC TROMETHAMINE 30 MG: 30 INJECTION, SOLUTION INTRAMUSCULAR; INTRAVENOUS at 04:22

## 2020-12-14 RX ADMIN — ASPIRIN 81 MG: 81 TABLET, COATED ORAL at 08:09

## 2020-12-14 RX ADMIN — MORPHINE SULFATE 4 MG: 4 INJECTION, SOLUTION INTRAMUSCULAR; INTRAVENOUS at 01:37

## 2020-12-14 RX ADMIN — SODIUM CHLORIDE, PRESERVATIVE FREE 10 ML: 5 INJECTION INTRAVENOUS at 08:10

## 2020-12-14 RX ADMIN — METFORMIN HYDROCHLORIDE 500 MG: 500 TABLET ORAL at 08:09

## 2020-12-14 ASSESSMENT — PAIN DESCRIPTION - LOCATION: LOCATION: STERNUM

## 2020-12-14 ASSESSMENT — PAIN DESCRIPTION - PAIN TYPE: TYPE: SURGICAL PAIN

## 2020-12-14 ASSESSMENT — PAIN SCALES - GENERAL
PAINLEVEL_OUTOF10: 10
PAINLEVEL_OUTOF10: 10
PAINLEVEL_OUTOF10: 0

## 2020-12-14 NOTE — PROGRESS NOTES
Discussed Home Health options with pt. Pt states he does not want home health and does not need it. Informed of benefits and need for home health. Pt has declined.      Electronically signed by Linn Luo on 12/14/2020 at 10:38 AM

## 2020-12-14 NOTE — CARE COORDINATION
Home Health Referral received by [id number] [id number] Intake. Patient stated that they did not want home health at this time. Will complete the order and sign off.  Electronically signed by Kevin Almanza on 12/14/20 at 10:39 AM CST

## 2020-12-14 NOTE — DISCHARGE SUMMARY
[address] [facility]  Discharge Summary    Patient ID: Karan Clark      Patient's PCP: Eladio Banegas MD    Admit Date: 12/10/2020     Discharge Date:  12/14/2020    Admitting Physician: Karishma Marquez MD     Discharge Physician: Dr. Hermila Burris     Discharge Diagnoses:     Primary:   1. Severe Ischemic Dilated Cardiomyopathy with Severe Multivessel CAD      Secondary:   2. Severe LV Systolic Dysfunction with EF 20%  3. Postoperative Anemia 2' to Acute Blood Loss, Expected  4. Essential HTN  5.         DM, Type 2, Uncontrolled with HgbA1c 8.3  6. Mixed Hyperlipidemia  7. Severe [place]    Procedures This Admit:   1. On 12/10/2020, 3-Vessel Perfusion-Assisted Coronary Artery Bypass Grafting, Placing LIMA - LAD, SVG - OM, and SVG - from OM Vein Graft to PDA by Dr. Hermila Burris    Consults:   None      Complications:   1. Postoperative Anemia 2' to Acute Blood Loss, Expected    The patient was seen and examined on day of discharge and this discharge summary is in conjunction with any daily progress note from day of discharge. History of Present Illness and Hospital Course:   Mr. John Guzman is a 60year old, diabetic male, who presented to his nurse practitioner in [place], [place] for routine followup and was found to be significantly tachycardic. Because of this, he was referred to a cardiologist for evaluation. A 2D echo demonstrated evidence of a dilated ischemic cardiomyopathy with very low ejection fraction of only 20% to 30%. The patient underwent left heart catheterization. This revealed left ventricle that showed a large area of anterior apical wall and inferior wall akinesis with a  decreased ejection fraction of only 20%. The left main artery had some distal disease, but not significant. The LAD was totally occluded proximally, only small segments filled the left to left collaterals.  There appeared to be a moderate-sized diagonal branch and appeared to have some degree of disease. The circumflex system was a balanced system. There was a proximal complex 80% stenosis that involved a first obtuse marginal branch. The RCA was relatively small. It was totally occluded near its origin. The PDA and distal RCA filled by right to right collaterals. Due to severity of coronary disease with severe LV dysfunction, patient was referred to CT surgery for evaluation. He was seen by Dr. Ann Alberts on 11/24/2020. Bypass surgery was felt to be the patient's best option, therefore, the operation was explained and discussed in detail, including the risks and benefits, with the patient and his wife. Both voiced understanding and were in agreement to proceed. He was admitted on 12/10/2020, taken to the OR and underwent 3V PACABG, placing LIMA to LAD, SVG - OM and SVG from OM vein graft to PDA. He tolerated the operation without complication and was taken to the ICU for recovery. During the postoperative course, he was extubated the evening of surgery without difficulty. He was sitting up in the chair, hemodynamically stable on POD 1. CXR demonstrated possible early ARDS. He was started on a diuretic and remained in the ICU for close monitoring. On POD 2, patient remained stable, with noted postoperative anemia. MCT and left pleural CT were removed. He was felt stable for transfer to PCU for ongoing monitoring and therapy. He was started on BB and Entresto. Labs and CXR were repeated on POD 4. CXR demonstrated clear lung fields. Labs were stable. He was ambulating without difficulty tolerating his diet and pain controlled. He was felt stable for discharge home. Disposition:  Home with Home Health    Follow-up:    1. Hospital Follow-up with PCP in 1 week. 2. Appointment with Dr. Ann Alberts in 2 weeks for Staple Removal.  3. Appointment with Dr. Ann Alberts in 4 weeks for Routine Postoperative Follow-up.    4. Cardiology Follow-up with Dr. Zenobia Hernandez in 6 weeks.      Discharge Medications:     ALPRAZolam (XANAX) 1 MG tablet  Take 1 mg by mouth 2 times daily. For 30 days     aspirin EC 81 MG EC tablet  Take 81 mg by mouth daily     carvedilol (COREG) 6.25 MG tablet  Take 1 tablet by mouth 2 times daily (with meals)     clopidogrel (PLAVIX) 75 MG tablet  Take 1 tablet by mouth daily     DULoxetine (CYMBALTA) 30 MG extended release capsule  Take 30 mg by mouth daily For 30 days     metFORMIN (GLUCOPHAGE) 500 MG tablet  Take 500 mg by mouth 2 times daily (with meals)     mirtazapine (REMERON) 15 MG tablet  Take 45 mg by mouth nightly      oxyCODONE (ROXICODONE) 5 MG immediate release tablet  Take 1 tablet by mouth every 6 hours as needed for Pain for up to 14 days.      potassium chloride (KLOR-CON M) 10 MEQ extended release tablet  Take 1 tablet by mouth daily     rosuvastatin (CRESTOR) 10 MG tablet  Take 10 mg by mouth nightly For 30 days      sacubitril-valsartan (ENTRESTO) 24-26 MG per tablet  Take 1 tablet by mouth 2 times daily     tiZANidine (ZANAFLEX) 4 MG tablet  Take 4 mg by mouth every 8 hours as needed For 20 days

## 2020-12-14 NOTE — CONSULTS
Post-op Cardiac Rehab education packet was sent to the patient's address on record. Handouts included were titled; \"Home Instructions Following a Cardiac Event\", \"Cardiac Home Exercise Program - Phase I\", \"Risk Factors for Heart Disease and Stroke\" and \"Cardiac Diet/Low Cholesterol\". Patient was instructed to contact [place] or the hospital nearest their residence for the opportunity to enroll in Phase II Outpatient Cardiac Rehab.

## 2020-12-14 NOTE — PROGRESS NOTES
POST OP CARDIOTHORACIC SURGERY PROGRESS NOTE    Post op day 4    SUBJECTIVE:  Sitting up in chair. No events overnight. Had BM this morning. /63   Pulse 95   Temp 97.1 °F (36.2 °C) (Temporal)   Resp 18   Ht 5' 11\" (1.803 m)   Wt 234 lb (106.1 kg)   SpO2 91%   BMI 32.64 kg/m²   Average, Min, and Max for last 24 hours Vitals:  TEMPERATURE:  Temp  Av.7 °F (36.5 °C)  Min: 96.9 °F (36.1 °C)  Max: 98.3 °F (36.8 °C)  RESPIRATIONS RANGE: Resp  Av.8  Min: 16  Max: 20  PULSE RANGE: Pulse  Av.8  Min: 82  Max: 106  BLOOD PRESSURE RANGE:  Systolic (57TLI), OPC:668 , Min:100 , BDB:863   ; Diastolic (51MBF), PNI:09, Min:63, Max:79    PULSE OXIMETRY RANGE: SpO2  Av.7 %  Min: 90 %  Max: 95 %    I/O last 3 completed shifts: In: 240 [P.O.:240]  Out: 975 [Urine:975]    CHEST: Clear bilateral breath sounds without wheezes or rhonchi. CARDIOVASCULAR: Normal HT with RRR. No murmurs, gallops or rubs. INCISION: Sternal incision - open to air. Edges approximated. No drainage or erythema. Right EVH incision - open to air. Edges approximated. No drainage or erythema. RLE incision with staples intact - open to air. Edges approximated. No drainage or erythema.        LABS:  CBC:   Lab Results   Component Value Date    WBC 7.3 2020    RBC 3.31 2020    HGB 9.9 2020    HCT 30.6 2020    MCV 92.4 2020    MCH 29.9 2020    MCHC 32.4 2020    RDW 12.9 2020     2020    MPV 11.3 2020     CMP:    Lab Results   Component Value Date     2020    K 3.3 2020    CL 98 2020    CO2 30 2020    BUN 15 2020    CREATININE 0.6 2020    GFRAA >59 2020    LABGLOM >60 2020    GLUCOSE 103 2020    PROT 5.9 2020    LABALBU 3.7 2020    CALCIUM 8.3 2020    BILITOT 0.7 2020    ALKPHOS 57 2020    AST 35 2020    ALT 17 2020 CHEST XRAY: Normal postoperative changes. ASSESSMENT:     1. Severe Ischemic Dilated Cardiomyopathy with Severe Multivessel CAD - S/P 3V PACABG on 12/10/2020  2. Severe LV Systolic Dysfunction with EF 20%  3. Postoperative Anemia 2' to Acute Blood Loss, Expected  4. Essential HTN  5. DM, Type 2, Uncontrolled with HgbA1c 8.3  6. Mixed Hyperlipidemia  7. Severe [place]      PLAN:     1. D/C [facility]  2.  D/C Home    Electronically signed by MUNA Albrecht on 12/14/20 at 7:01 AM CST

## 2020-12-16 LAB — MRSA CULTURE ONLY: NORMAL

## 2020-12-23 ENCOUNTER — OFFICE VISIT (OUTPATIENT)
Dept: CARDIOTHORACIC SURGERY | Age: 60
End: 2020-12-23

## 2020-12-23 VITALS — OXYGEN SATURATION: 96 % | DIASTOLIC BLOOD PRESSURE: 62 MMHG | SYSTOLIC BLOOD PRESSURE: 88 MMHG | HEART RATE: 90 BPM

## 2020-12-23 PROCEDURE — 99024 POSTOP FOLLOW-UP VISIT: CPT | Performed by: THORACIC SURGERY (CARDIOTHORACIC VASCULAR SURGERY)

## 2020-12-23 NOTE — PROGRESS NOTES
Staples removed from Rt lower leg (12) incision was well approximated with slight pink at edges. Steri strips placed. Pt was also assessed by Dr Piyush Murdock as wife was stating PCP had seen pt earlier this am and said he seemed \"yellow\". No issues noted at this visit. Pt bp was lower but pt denied any issues with lightheadedness. Dr Cecille Burroughs decided to not change medications since pt had no complaints of symptoms.

## 2021-01-11 ENCOUNTER — OFFICE VISIT (OUTPATIENT)
Dept: CARDIOTHORACIC SURGERY | Age: 61
End: 2021-01-11

## 2021-01-11 VITALS
RESPIRATION RATE: 18 BRPM | OXYGEN SATURATION: 98 % | BODY MASS INDEX: 31.36 KG/M2 | SYSTOLIC BLOOD PRESSURE: 123 MMHG | HEIGHT: 71 IN | HEART RATE: 96 BPM | DIASTOLIC BLOOD PRESSURE: 75 MMHG | WEIGHT: 224 LBS

## 2021-01-11 DIAGNOSIS — I70.0 THORACIC AORTA ATHEROSCLEROSIS (HCC): ICD-10-CM

## 2021-01-11 DIAGNOSIS — I25.5 ISCHEMIC CARDIOMYOPATHY: ICD-10-CM

## 2021-01-11 DIAGNOSIS — I25.110 ATHEROSCLEROSIS OF NATIVE CORONARY ARTERY OF NATIVE HEART WITH UNSTABLE ANGINA PECTORIS (HCC): Primary | ICD-10-CM

## 2021-01-11 PROCEDURE — 99024 POSTOP FOLLOW-UP VISIT: CPT | Performed by: THORACIC SURGERY (CARDIOTHORACIC VASCULAR SURGERY)

## 2021-01-11 RX ORDER — FERROUS SULFATE 325(65) MG
325 TABLET ORAL
COMMUNITY

## 2021-01-11 RX ORDER — MEGESTROL ACETATE 40 MG/1
40 TABLET ORAL DAILY
COMMUNITY

## 2021-01-11 NOTE — PROGRESS NOTES
[address], [place]  Phone: [phone]  Fax: [phone]      Office Visit:  21    Ladonna Covarrubias - : 1960    Reason For Visit:  Fito Rodriguez is a 63 y.o. male who is here for Post-Op Check (CABG)      History of Present Illness:      I had the pleasure of seeing Ladonna Covarrubias in the office today. As you know, he is a 63 y.o. diabetic male who presented with sinus tachycardia. A 2D echo demonstrated evidence of a dilated cardiomyopathy with very low ejection fraction of only 20%. The patient underwent cardiac catheterization performed at [facility] by Dr. Tayla Mahmood. This demonstrated severe multivessel coronary disease. The patient was transferred here for further urgent cardiac surgery. I took the patient to the operating room on 2020 where I performed three-vessel coronary bypass grafting. At the time of operation I noted severe diffuse coronary disease that was difficult to graft. In addition he had a diseased ascending thoracic aorta that made it difficult to place the proximal vein graft anastomoses. Overall, a very challenging operation. Fortunately the patient did well during the postoperative course he suffered no significant complications and was discharged home 4 days later     . Since his return home, he has continued to increase his ambulation as much as possible, now walking several times per day. His appetite is improving and he otherwise has the normal amount of postoperative discomfort. ALLERGIES: Patient has no known allergies.     Current Outpatient Medications   Medication Sig Dispense Refill    ferrous sulfate (IRON 325) 325 (65 Fe) MG tablet Take 325 mg by mouth daily (with breakfast)      megestrol (MEGACE) 40 MG tablet Take 40 mg by mouth daily      carvedilol (COREG) 6.25 MG tablet Take 1 tablet by mouth 2 times daily (with meals) 60 tablet 1  clopidogrel (PLAVIX) 75 MG tablet Take 1 tablet by mouth daily 30 tablet 1    potassium chloride (KLOR-CON M) 10 MEQ extended release tablet Take 1 tablet by mouth daily 30 tablet 0    metFORMIN (GLUCOPHAGE) 500 MG tablet Take 500 mg by mouth 2 times daily (with meals)      ALPRAZolam (XANAX) 1 MG tablet Take 1 mg by mouth 2 times daily. For 30 days      aspirin EC 81 MG EC tablet Take 81 mg by mouth daily      DULoxetine (CYMBALTA) 30 MG extended release capsule Take 30 mg by mouth daily For 30 days      sacubitril-valsartan (ENTRESTO) 24-26 MG per tablet Take 1 tablet by mouth 2 times daily      rosuvastatin (CRESTOR) 10 MG tablet Take 10 mg by mouth nightly For 30 days       tiZANidine (ZANAFLEX) 4 MG tablet Take 4 mg by mouth every 8 hours as needed For 20 days      mirtazapine (REMERON) 15 MG tablet Take 45 mg by mouth nightly        No current facility-administered medications for this visit. He is to continue the Plavix lifelong as he does have a very atherosclerotic ascending thoracic aorta and aortic arch that  predisposes him to a stroke. Review of Systems:   General: Denies any fever or chills. Energy level and endurance are returning to                              normal.    Respiratory: Denies any cough or hoarseness. Denies any SOB or DANIELLE. Cardiac: Denies any chest pain or pressure. Denies any palpitations. Denies any                             presyncope or syncope. Denies any orthopnea or PND. Physical Examination:   Vital signs: Blood pressure 123/75, pulse 96, resp. rate 18, height 5' 11\" (1.803 m), weight 224 lb (101.6 kg), SpO2 98 %. Lungs: Both lungs are clear with equal breath sounds. Cardiac: Demonstrates a regular rate and rhythm without murmurs, rubs, or                              gallops.

## (undated) DEVICE — SUTURE ABSORBABLE MONOFILAMENT 0 CTX 36 IN VIO PDS + PDP370T

## (undated) DEVICE — SUTURE VCRL SZ 2-0 L36IN ABSRB UD L36MM CT-1 1/2 CIR J945H

## (undated) DEVICE — STRIP SKIN CLSR W0.25XL4IN WHT SPUNBOUND FBR NYL HI ADH

## (undated) DEVICE — SUTURE PROL SZ 4-0 L36IN NONABSORBABLE BLU L17MM RB-1 1/2 M8557

## (undated) DEVICE — BLANKET THER AD W24XL60IN FAB COVERING SUP SFT ULT THN LTWT

## (undated) DEVICE — Z DUP USE 2367328 BLADE SAW 32X6.35 MM STRNM FOR CUT STERNOTOMY

## (undated) DEVICE — CONNECTOR DRNGE W3/8-0.5XH3/16XL3/16IN 2:1 SIL CARD STR

## (undated) DEVICE — GLOVE SURG SZ 7 L12IN FNGR THK94MIL TRNSLUC YEL LTX HYDRGEL

## (undated) DEVICE — STOCKING COMPR 2 REG

## (undated) DEVICE — TUBE ET 8MM NSL ORAL BASIC CUF INTMED MURPHY EYE RADPQ MRK

## (undated) DEVICE — COVER TRANSDUCER TELESCOPICALLY FOLDED 3.5X36 IN CIV-FLEX

## (undated) DEVICE — Device

## (undated) DEVICE — VESSEL SHUNT 2.00MM TAPERED TIP, 12MM SHAFT
Type: IMPLANTABLE DEVICE | Status: NON-FUNCTIONAL
Brand: SOF-FLO ATRAUMATIC CORONARY ARTERY SHUNT
Removed: 2020-12-10

## (undated) DEVICE — Device: Brand: CLEANCUT ROTATING AORTIC PUNCH

## (undated) DEVICE — SUTURE VCRL SZ 4-0 L27IN ABSRB UD L19MM PS-2 3/8 CIR PRIM J426H

## (undated) DEVICE — AEGIS 1" DISK 4MM HOLE, PEEL OPEN: Brand: MEDLINE

## (undated) DEVICE — DRESSING FOAM 0.75IN 1.5MM H DISK CHG IMPREG AEGIS

## (undated) DEVICE — DRESSING FOAM W4XL5CM THN ABSRB SELF ADH W/ SAFETAC MEPILEX

## (undated) DEVICE — INTENT TO BE USED WITH SUTURE MATERIAL FOR TISSUE CLOSURE: Brand: RICHARD-ALLAN®  NEEDLE 1/2 CIRCLE REVERSE CUTTING

## (undated) DEVICE — SUTURE NONABSORBABLE MONOFILAMENT 6-0 RB-2 4X30 IN PROLENE M8711

## (undated) DEVICE — DRAIN SURG SGL COLL PT TB FOR ATS BG OASIS

## (undated) DEVICE — DRAIN SURG 19FR SIL RND HUBLESS W/ 0.25IN BEND TRCR BLAK

## (undated) DEVICE — E-Z CLEAN, NON-STICK, PTFE COATED, ELECTROSURGICAL BLADE ELECTRODE, MODIFIED EXTENDED INSULATION, 2.5 INCH (6.35 CM): Brand: MEGADYNE

## (undated) DEVICE — SUTURE PROL SZ 5-0 L36IN NONABSORBABLE BLU L17MM RB-1 1/2 8556H

## (undated) DEVICE — SUTURE PERMAHAND SZ 2-0 L18IN NONABSORBABLE BLK L26MM FS 685G

## (undated) DEVICE — COVER,TABLE,44X90,STERILE: Brand: MEDLINE

## (undated) DEVICE — STYLET INTUB 5FR L25CM ORAL DESIGNED RET DESIRED CRV SMOOTH

## (undated) DEVICE — SET CATH 20GA L1.75IN RAD ART POLYUR RADPQ W/ INTEGR

## (undated) DEVICE — 3M™ TEGADERM™ TRANSPARENT FILM DRESSING FRAME STYLE, 1628, 6 IN X 8 IN (15 CM X 20 CM), 10/CT 8CT/CASE: Brand: 3M™ TEGADERM™

## (undated) DEVICE — VESSEL SHUNT 1.50MM TAPERED TIP, 12MM SHAFT
Type: IMPLANTABLE DEVICE | Status: NON-FUNCTIONAL
Brand: SOF-FLO ATRAUMATIC CORONARY ARTERY SHUNT
Removed: 2020-12-10

## (undated) DEVICE — CHLORAPREP 26ML ORANGE

## (undated) DEVICE — CLIP LIG M BLU TI HRT SHP WIRE HORZ 180 PER BX

## (undated) DEVICE — GOWN,PREVENTION PLUS,XL,ST,24/CS: Brand: MEDLINE

## (undated) DEVICE — DRESSING FOAM W4XL12IN SIL RECT ADH WTRPRF FLM BK W/ BORD

## (undated) DEVICE — WAX SURG 2.5GM HEMSTAT BNE BEESWAX PARAFFIN ISO PALMITATE

## (undated) DEVICE — MEDI-VAC YANK SUCT HNDL W/TPRD BULBOUS TIP: Brand: CARDINAL HEALTH

## (undated) DEVICE — DRAIN SURG L3/8-1/2IN DIA3/16IN SIL CARD CONN 1:1 BLAK

## (undated) DEVICE — DRESSING FOAM SELF ADH 20X10 CM ABSORBENT MEPILEX BORDER

## (undated) DEVICE — GLOVE SURG SZ 65 L12IN FNGR THK79MIL GRN LTX FREE

## (undated) DEVICE — JP CHANNEL DRAIN, 24FR HUBLESS: Brand: CARDINAL HEALTH

## (undated) DEVICE — Z INACTIVE USE 2662641 SOLUTION IV 1000ML 140MEQ/L SOD 5MEQ/L K 3MEQ/L MG 27MEQ/L

## (undated) DEVICE — KIT BLWR MISTER 5P 15L W/ TBNG SET IRRIG MIST TO IMPROVE

## (undated) DEVICE — ACCESSORY TOWEL PACK: Brand: MEDLINE INDUSTRIES, INC.

## (undated) DEVICE — CLIP INT SM TI EZ LD LIG SYS WECK HORZ

## (undated) DEVICE — BLADE SAW W6.35XL32MM STRNM CUT STRNOTMY

## (undated) DEVICE — Z DISCONTINUED PER MEDLINE USE 2718072 DRESSING FOAM W5XL12.5CM SIL ADH THN BORDED CNFRM LO PROF

## (undated) DEVICE — LOOP VES VASCO 18 G SIL W/ BLNT NDL

## (undated) DEVICE — Z DUP USE 2537558 SYSTEM ENDOSCP VES HARV VASO VW HEMOPRO

## (undated) DEVICE — AORTIC PUNCHES ARE USED TO CREATE A UNIFORM OPENING IN BLOOD VESSELS DURING CARDIOVASCULAR SURGERY. THE VESSEL GRAFT IS INSERTED INTO THE CREATED OPENING AND SUTURED TO THE VESSEL WALL. AORTIC LANCETS ARE USED TO MAKE A SMALL UNIFORM CUT IN A BLOOD VESSEL TO FACILITATE INSERTION OF AN AORTIC PUNCH.  PUNCHES COME IN VARIOUS LENGTHS, DIAMETERS AND TIP CONFIGURATIONS.: Brand: CLEANCUT ROTATING AORTIC PUNCH

## (undated) DEVICE — SOLUTION IV IRRIG POUR BRL 0.9% SODIUM CHL 2F7124

## (undated) DEVICE — SHEET,DRAPE,53X77,STERILE: Brand: MEDLINE

## (undated) DEVICE — TUBING SUCT 9 11FR L475IN RIG SHFT MINI SUC TIP DLP

## (undated) DEVICE — GLOVE SURG SZ 6 L12IN FNGR THK94MIL TRNSLUC YEL LTX HYDRGEL

## (undated) DEVICE — DEVICE RESUS AD TB L40IN SELF INFL MASK TEXT BG DBL SWVL EL

## (undated) DEVICE — KIT INTRO 8.5FR L4IN PERC POLYUR SHTH RADPQ W/ INTEGR

## (undated) DEVICE — GLOVE SURG SZ 7 CRM LTX FREE POLYISOPRENE POLYMER BEAD ANTI

## (undated) DEVICE — SOLUTION IV 500ML 0.9% SOD CHL PH 5 INJ USP VIAFLX PLAS

## (undated) DEVICE — MASTISOL ADHESIVE LIQ 2/3ML

## (undated) DEVICE — GLOVE SURG SZ 8 L11.6IN THK9.8MIL STRW LTX POLYMER BEAD CUF

## (undated) DEVICE — SURGICAL PROCEDURE PACK OPN HRT LOURDES HOSP

## (undated) DEVICE — Z DUP USE 2266075 STABILIZER SURG VAC STD BLDE ACCESSRAIL PLATFRM SUCT POD

## (undated) DEVICE — SS SUTURE, 3 PER SLEEVE: Brand: MYO/WIRE II

## (undated) DEVICE — ROYAL SILK SURGICAL GOWN, XXL: Brand: CONVERTORS

## (undated) DEVICE — CATHETER THRMDIL 7.5FR L110CM PROXIMAL/DISTAL PRT L30CM STD

## (undated) DEVICE — SOLUTION CARDPLG H2O DIL 1000 ML CARD PERF VIAFLX

## (undated) DEVICE — SUTURE PROL SZ 7-0 L24IN NONABSORBABLE BLU L9.3MM BV-1 3/8 M8702

## (undated) DEVICE — SOLUTION IV 250ML 0.9% SOD CHL PH 5 INJ USP VIAFLX PLAS

## (undated) DEVICE — CANNULA PERF L1.3IN TIP L2MM S STL POLYUR TB ARTOTMY BLB

## (undated) DEVICE — CLIP RETRCT CANN 30MM

## (undated) DEVICE — SOLUTION IV 1000ML 0.9% SOD CHL PH 5 INJ USP VIAFLX PLAS

## (undated) DEVICE — SYSTEM SKIN CLSR 22CM DERMBND PRINEO

## (undated) DEVICE — SUTURE N ABSRB BRAIDED 2-0 RB1 30 IN COAT WHT ETHBND EXCEL MX523

## (undated) DEVICE — SYSTEM SEAL 4.3MM PROX HEMSTAT HEARTSTRING III

## (undated) DEVICE — BLADE MAC GRN LT DISPOSABLE

## (undated) DEVICE — SUTURE ETHBND EXCEL SZ 2-0 L36IN NONABSORBABLE GRN L26MM SH X523H

## (undated) DEVICE — SUTURE PROL SZ 6-0 L30IN NONABSORBABLE BLU L13MM RB-2 1/2 8711H

## (undated) DEVICE — 3M™ STERI-STRIP™ REINFORCED ADHESIVE SKIN CLOSURES, R1546, 1/4 IN X 4 IN (6 MM X 100 MM), 10 STRIPS/ENVELOPE: Brand: 3M™ STERI-STRIP™

## (undated) DEVICE — BLADE LARYNGOSCOPE MACINTOSH 3